# Patient Record
Sex: MALE | Race: WHITE | NOT HISPANIC OR LATINO | Employment: STUDENT | URBAN - METROPOLITAN AREA
[De-identification: names, ages, dates, MRNs, and addresses within clinical notes are randomized per-mention and may not be internally consistent; named-entity substitution may affect disease eponyms.]

---

## 2024-11-24 ENCOUNTER — HOSPITAL ENCOUNTER (EMERGENCY)
Facility: HOSPITAL | Age: 19
Discharge: PSYCHIATRIC OR SUBSTANCE USE FACILITY | End: 2024-11-25
Attending: EMERGENCY MEDICINE
Payer: COMMERCIAL

## 2024-11-24 DIAGNOSIS — R45.89 SUICIDAL BEHAVIOR WITHOUT ATTEMPTED SELF-INJURY: Primary | ICD-10-CM

## 2024-11-24 PROCEDURE — 99285 EMERGENCY DEPT VISIT HI MDM: CPT

## 2024-11-24 ASSESSMENT — COLUMBIA-SUICIDE SEVERITY RATING SCALE - C-SSRS
5. HAVE YOU STARTED TO WORK OUT OR WORKED OUT THE DETAILS OF HOW TO KILL YOURSELF? DO YOU INTEND TO CARRY OUT THIS PLAN?: YES
2. HAVE YOU ACTUALLY HAD ANY THOUGHTS OF KILLING YOURSELF?: YES
6. HAVE YOU EVER DONE ANYTHING, STARTED TO DO ANYTHING, OR PREPARED TO DO ANYTHING TO END YOUR LIFE?: NO
4. HAVE YOU HAD THESE THOUGHTS AND HAD SOME INTENTION OF ACTING ON THEM?: YES
1. IN THE PAST MONTH, HAVE YOU WISHED YOU WERE DEAD OR WISHED YOU COULD GO TO SLEEP AND NOT WAKE UP?: YES

## 2024-11-25 ENCOUNTER — APPOINTMENT (OUTPATIENT)
Dept: CARDIOLOGY | Facility: HOSPITAL | Age: 19
End: 2024-11-25
Payer: COMMERCIAL

## 2024-11-25 ENCOUNTER — HOSPITAL ENCOUNTER (INPATIENT)
Facility: HOSPITAL | Age: 19
End: 2024-11-25
Attending: PSYCHIATRY & NEUROLOGY | Admitting: PSYCHIATRY & NEUROLOGY
Payer: COMMERCIAL

## 2024-11-25 VITALS
HEIGHT: 70 IN | TEMPERATURE: 98.2 F | BODY MASS INDEX: 16.75 KG/M2 | OXYGEN SATURATION: 98 % | HEART RATE: 43 BPM | SYSTOLIC BLOOD PRESSURE: 125 MMHG | DIASTOLIC BLOOD PRESSURE: 70 MMHG | WEIGHT: 117 LBS | RESPIRATION RATE: 14 BRPM

## 2024-11-25 DIAGNOSIS — E55.9 VITAMIN D INSUFFICIENCY: Primary | ICD-10-CM

## 2024-11-25 DIAGNOSIS — F33.2 SEVERE EPISODE OF RECURRENT MAJOR DEPRESSIVE DISORDER, WITHOUT PSYCHOTIC FEATURES (MULTI): ICD-10-CM

## 2024-11-25 DIAGNOSIS — G47.9 SLEEP DIFFICULTIES: ICD-10-CM

## 2024-11-25 PROBLEM — R45.851 DEPRESSION WITH SUICIDAL IDEATION: Status: ACTIVE | Noted: 2024-11-25

## 2024-11-25 PROBLEM — D45 POLYCYTHEMIA VERA: Status: ACTIVE | Noted: 2024-06-03

## 2024-11-25 PROBLEM — R63.6 UNDERWEIGHT: Status: ACTIVE | Noted: 2024-11-25

## 2024-11-25 PROBLEM — R45.89 DEPRESSED MOOD: Status: ACTIVE | Noted: 2024-06-03

## 2024-11-25 PROBLEM — F32.A DEPRESSION WITH SUICIDAL IDEATION: Status: ACTIVE | Noted: 2024-11-25

## 2024-11-25 LAB
ALBUMIN SERPL BCP-MCNC: 4.9 G/DL (ref 3.4–5)
ALP SERPL-CCNC: 69 U/L (ref 33–120)
ALT SERPL W P-5'-P-CCNC: 6 U/L (ref 10–52)
AMPHETAMINES UR QL SCN: NORMAL
ANION GAP SERPL CALC-SCNC: 11 MMOL/L (ref 10–20)
APAP SERPL-MCNC: <10 UG/ML
APPEARANCE UR: CLEAR
AST SERPL W P-5'-P-CCNC: 13 U/L (ref 9–39)
BARBITURATES UR QL SCN: NORMAL
BASOPHILS # BLD AUTO: 0.04 X10*3/UL (ref 0–0.1)
BASOPHILS NFR BLD AUTO: 0.6 %
BENZODIAZ UR QL SCN: NORMAL
BILIRUB SERPL-MCNC: 0.5 MG/DL (ref 0–1.2)
BILIRUB UR STRIP.AUTO-MCNC: NEGATIVE MG/DL
BUN SERPL-MCNC: 13 MG/DL (ref 6–23)
BZE UR QL SCN: NORMAL
CALCIUM SERPL-MCNC: 9.2 MG/DL (ref 8.6–10.3)
CANNABINOIDS UR QL SCN: NORMAL
CHLORIDE SERPL-SCNC: 105 MMOL/L (ref 98–107)
CO2 SERPL-SCNC: 26 MMOL/L (ref 21–32)
COLOR UR: YELLOW
CREAT SERPL-MCNC: 0.93 MG/DL (ref 0.5–1.3)
EGFRCR SERPLBLD CKD-EPI 2021: >90 ML/MIN/1.73M*2
EOSINOPHIL # BLD AUTO: 0.24 X10*3/UL (ref 0–0.7)
EOSINOPHIL NFR BLD AUTO: 3.5 %
ERYTHROCYTE [DISTWIDTH] IN BLOOD BY AUTOMATED COUNT: 12.4 % (ref 11.5–14.5)
ETHANOL SERPL-MCNC: <10 MG/DL
FENTANYL+NORFENTANYL UR QL SCN: NORMAL
GLUCOSE SERPL-MCNC: 113 MG/DL (ref 74–99)
GLUCOSE UR STRIP.AUTO-MCNC: NORMAL MG/DL
HCT VFR BLD AUTO: 50.3 % (ref 41–52)
HGB BLD-MCNC: 17.1 G/DL (ref 13.5–17.5)
HOLD SPECIMEN: NORMAL
IMM GRANULOCYTES # BLD AUTO: 0.01 X10*3/UL (ref 0–0.7)
IMM GRANULOCYTES NFR BLD AUTO: 0.1 % (ref 0–0.9)
KETONES UR STRIP.AUTO-MCNC: NEGATIVE MG/DL
LEUKOCYTE ESTERASE UR QL STRIP.AUTO: NEGATIVE
LYMPHOCYTES # BLD AUTO: 2.19 X10*3/UL (ref 1.2–4.8)
LYMPHOCYTES NFR BLD AUTO: 31.8 %
MCH RBC QN AUTO: 28.1 PG (ref 26–34)
MCHC RBC AUTO-ENTMCNC: 34 G/DL (ref 32–36)
MCV RBC AUTO: 83 FL (ref 80–100)
METHADONE UR QL SCN: NORMAL
MONOCYTES # BLD AUTO: 0.5 X10*3/UL (ref 0.1–1)
MONOCYTES NFR BLD AUTO: 7.3 %
NEUTROPHILS # BLD AUTO: 3.91 X10*3/UL (ref 1.2–7.7)
NEUTROPHILS NFR BLD AUTO: 56.7 %
NITRITE UR QL STRIP.AUTO: NEGATIVE
NRBC BLD-RTO: 0 /100 WBCS (ref 0–0)
OPIATES UR QL SCN: NORMAL
OXYCODONE+OXYMORPHONE UR QL SCN: NORMAL
PCP UR QL SCN: NORMAL
PH UR STRIP.AUTO: 7 [PH]
PLATELET # BLD AUTO: 239 X10*3/UL (ref 150–450)
POTASSIUM SERPL-SCNC: 3.8 MMOL/L (ref 3.5–5.3)
PROT SERPL-MCNC: 7.3 G/DL (ref 6.4–8.2)
PROT UR STRIP.AUTO-MCNC: NEGATIVE MG/DL
RBC # BLD AUTO: 6.09 X10*6/UL (ref 4.5–5.9)
RBC # UR STRIP.AUTO: NEGATIVE /UL
SALICYLATES SERPL-MCNC: <3 MG/DL
SODIUM SERPL-SCNC: 138 MMOL/L (ref 136–145)
SP GR UR STRIP.AUTO: 1.03
UROBILINOGEN UR STRIP.AUTO-MCNC: ABNORMAL MG/DL
WBC # BLD AUTO: 6.9 X10*3/UL (ref 4.4–11.3)

## 2024-11-25 PROCEDURE — 80053 COMPREHEN METABOLIC PANEL: CPT | Performed by: EMERGENCY MEDICINE

## 2024-11-25 PROCEDURE — 81003 URINALYSIS AUTO W/O SCOPE: CPT | Mod: 59 | Performed by: EMERGENCY MEDICINE

## 2024-11-25 PROCEDURE — 80179 DRUG ASSAY SALICYLATE: CPT | Performed by: EMERGENCY MEDICINE

## 2024-11-25 PROCEDURE — 85025 COMPLETE CBC W/AUTO DIFF WBC: CPT | Performed by: EMERGENCY MEDICINE

## 2024-11-25 PROCEDURE — 36415 COLL VENOUS BLD VENIPUNCTURE: CPT | Performed by: EMERGENCY MEDICINE

## 2024-11-25 PROCEDURE — 80307 DRUG TEST PRSMV CHEM ANLYZR: CPT | Performed by: EMERGENCY MEDICINE

## 2024-11-25 PROCEDURE — 99221 1ST HOSP IP/OBS SF/LOW 40: CPT | Performed by: INTERNAL MEDICINE

## 2024-11-25 PROCEDURE — 1240000001 HC SEMI-PRIVATE BH ROOM DAILY

## 2024-11-25 PROCEDURE — 93005 ELECTROCARDIOGRAM TRACING: CPT

## 2024-11-25 PROCEDURE — 99223 1ST HOSP IP/OBS HIGH 75: CPT | Performed by: PSYCHIATRY & NEUROLOGY

## 2024-11-25 PROCEDURE — 2500000001 HC RX 250 WO HCPCS SELF ADMINISTERED DRUGS (ALT 637 FOR MEDICARE OP): Performed by: PSYCHIATRY & NEUROLOGY

## 2024-11-25 RX ORDER — HALOPERIDOL 5 MG/1
5 TABLET ORAL EVERY 6 HOURS PRN
Status: DISCONTINUED | OUTPATIENT
Start: 2024-11-25 | End: 2024-12-03 | Stop reason: HOSPADM

## 2024-11-25 RX ORDER — HYDROXYZINE PAMOATE 50 MG/1
50 CAPSULE ORAL EVERY 4 HOURS PRN
Status: DISCONTINUED | OUTPATIENT
Start: 2024-11-25 | End: 2024-12-03 | Stop reason: HOSPADM

## 2024-11-25 RX ORDER — ALUMINUM HYDROXIDE, MAGNESIUM HYDROXIDE, AND SIMETHICONE 1200; 120; 1200 MG/30ML; MG/30ML; MG/30ML
30 SUSPENSION ORAL EVERY 6 HOURS PRN
Status: DISCONTINUED | OUTPATIENT
Start: 2024-11-25 | End: 2024-12-03 | Stop reason: HOSPADM

## 2024-11-25 RX ORDER — ACETAMINOPHEN 500 MG
5 TABLET ORAL NIGHTLY PRN
Status: DISCONTINUED | OUTPATIENT
Start: 2024-11-25 | End: 2024-12-03 | Stop reason: HOSPADM

## 2024-11-25 RX ORDER — IBUPROFEN 600 MG/1
600 TABLET ORAL EVERY 8 HOURS PRN
Status: DISCONTINUED | OUTPATIENT
Start: 2024-11-25 | End: 2024-12-03 | Stop reason: HOSPADM

## 2024-11-25 RX ORDER — ESCITALOPRAM OXALATE 5 MG/1
5 TABLET ORAL DAILY
COMMUNITY
End: 2024-12-03 | Stop reason: HOSPADM

## 2024-11-25 RX ORDER — DIPHENHYDRAMINE HYDROCHLORIDE 50 MG/ML
50 INJECTION INTRAMUSCULAR; INTRAVENOUS ONCE AS NEEDED
Status: DISCONTINUED | OUTPATIENT
Start: 2024-11-25 | End: 2024-12-03 | Stop reason: HOSPADM

## 2024-11-25 RX ORDER — LORAZEPAM 1 MG/1
2 TABLET ORAL EVERY 6 HOURS PRN
Status: DISCONTINUED | OUTPATIENT
Start: 2024-11-25 | End: 2024-12-03 | Stop reason: HOSPADM

## 2024-11-25 RX ORDER — ESCITALOPRAM OXALATE 10 MG/1
10 TABLET ORAL NIGHTLY
Status: DISCONTINUED | OUTPATIENT
Start: 2024-11-25 | End: 2024-11-26

## 2024-11-25 RX ORDER — MICONAZOLE NITRATE 2 %
2 CREAM (GRAM) TOPICAL EVERY 2 HOUR PRN
Status: DISCONTINUED | OUTPATIENT
Start: 2024-11-25 | End: 2024-11-25

## 2024-11-25 RX ORDER — DIPHENHYDRAMINE HCL 50 MG
50 CAPSULE ORAL EVERY 6 HOURS PRN
Status: DISCONTINUED | OUTPATIENT
Start: 2024-11-25 | End: 2024-12-03 | Stop reason: HOSPADM

## 2024-11-25 RX ORDER — HALOPERIDOL 5 MG/ML
5 INJECTION INTRAMUSCULAR EVERY 6 HOURS PRN
Status: DISCONTINUED | OUTPATIENT
Start: 2024-11-25 | End: 2024-12-03 | Stop reason: HOSPADM

## 2024-11-25 RX ORDER — LORAZEPAM 2 MG/ML
2 INJECTION INTRAMUSCULAR EVERY 6 HOURS PRN
Status: DISCONTINUED | OUTPATIENT
Start: 2024-11-25 | End: 2024-12-03 | Stop reason: HOSPADM

## 2024-11-25 RX ADMIN — ESCITALOPRAM OXALATE 10 MG: 10 TABLET ORAL at 20:05

## 2024-11-25 SDOH — HEALTH STABILITY: MENTAL HEALTH: HOW OFTEN DO YOU HAVE SIX OR MORE DRINKS ON ONE OCCASION?: NEVER

## 2024-11-25 SDOH — ECONOMIC STABILITY: TRANSPORTATION INSECURITY: IN THE PAST 12 MONTHS, HAS LACK OF TRANSPORTATION KEPT YOU FROM MEDICAL APPOINTMENTS OR FROM GETTING MEDICATIONS?: NO

## 2024-11-25 SDOH — SOCIAL STABILITY: SOCIAL INSECURITY: ARE YOU MARRIED, WIDOWED, DIVORCED, SEPARATED, NEVER MARRIED, OR LIVING WITH A PARTNER?: NEVER MARRIED

## 2024-11-25 SDOH — SOCIAL STABILITY: SOCIAL NETWORK: IN A TYPICAL WEEK, HOW MANY TIMES DO YOU TALK ON THE PHONE WITH FAMILY, FRIENDS, OR NEIGHBORS?: TWICE A WEEK

## 2024-11-25 SDOH — HEALTH STABILITY: MENTAL HEALTH

## 2024-11-25 SDOH — SOCIAL STABILITY: SOCIAL INSECURITY
WITHIN THE LAST YEAR, HAVE YOU BEEN KICKED, HIT, SLAPPED, OR OTHERWISE PHYSICALLY HURT BY YOUR PARTNER OR EX-PARTNER?: NO

## 2024-11-25 SDOH — HEALTH STABILITY: MENTAL HEALTH: BEHAVIORS/MOOD: CALM;PLEASANT;COOPERATIVE;WITHDRAWN

## 2024-11-25 SDOH — HEALTH STABILITY: MENTAL HEALTH: ANXIETY SYMPTOMS: NO PROBLEMS REPORTED OR OBSERVED.

## 2024-11-25 SDOH — HEALTH STABILITY: MENTAL HEALTH: BEHAVIORS/MOOD: PLEASANT;CALM;WITHDRAWN;COOPERATIVE

## 2024-11-25 SDOH — SOCIAL STABILITY: SOCIAL NETWORK: PARENT/GUARDIAN/SIGNIFICANT OTHER INVOLVEMENT: ATTENTIVE TO PATIENT NEEDS

## 2024-11-25 SDOH — SOCIAL STABILITY: SOCIAL NETWORK: HOW OFTEN DO YOU ATTEND CHURCH OR RELIGIOUS SERVICES?: NEVER

## 2024-11-25 SDOH — ECONOMIC STABILITY: FOOD INSECURITY: HOW HARD IS IT FOR YOU TO PAY FOR THE VERY BASICS LIKE FOOD, HOUSING, MEDICAL CARE, AND HEATING?: NOT HARD AT ALL

## 2024-11-25 SDOH — SOCIAL STABILITY: SOCIAL NETWORK

## 2024-11-25 SDOH — HEALTH STABILITY: MENTAL HEALTH: HAVE YOU EVER DONE ANYTHING, STARTED TO DO ANYTHING, OR PREPARED TO DO ANYTHING TO END YOUR LIFE?: NO

## 2024-11-25 SDOH — SOCIAL STABILITY: SOCIAL INSECURITY: FAMILY BEHAVIORS: APPROPRIATE FOR SITUATION;CALM;SUPPORTIVE;COOPERATIVE

## 2024-11-25 SDOH — HEALTH STABILITY: MENTAL HEALTH: FOR HIGH RISK PATIENTS: 1:1 PATIENT OBSERVER AT ALL TIMES

## 2024-11-25 SDOH — HEALTH STABILITY: PHYSICAL HEALTH: ON AVERAGE, HOW MANY DAYS PER WEEK DO YOU ENGAGE IN MODERATE TO STRENUOUS EXERCISE (LIKE A BRISK WALK)?: 0 DAYS

## 2024-11-25 SDOH — SOCIAL STABILITY: SOCIAL INSECURITY
WITHIN THE LAST YEAR, HAVE YOU BEEN RAPED OR FORCED TO HAVE ANY KIND OF SEXUAL ACTIVITY BY YOUR PARTNER OR EX-PARTNER?: NO

## 2024-11-25 SDOH — SOCIAL STABILITY: SOCIAL INSECURITY: DO YOU FEEL ANYONE HAS EXPLOITED OR TAKEN ADVANTAGE OF YOU FINANCIALLY OR OF YOUR PERSONAL PROPERTY?: NO

## 2024-11-25 SDOH — SOCIAL STABILITY: SOCIAL INSECURITY: ARE THERE ANY APPARENT SIGNS OF INJURIES/BEHAVIORS THAT COULD BE RELATED TO ABUSE/NEGLECT?: NO

## 2024-11-25 SDOH — ECONOMIC STABILITY: HOUSING INSECURITY: IN THE LAST 12 MONTHS, WAS THERE A TIME WHEN YOU WERE NOT ABLE TO PAY THE MORTGAGE OR RENT ON TIME?: NO

## 2024-11-25 SDOH — ECONOMIC STABILITY: HOUSING INSECURITY: AT ANY TIME IN THE PAST 12 MONTHS, WERE YOU HOMELESS OR LIVING IN A SHELTER (INCLUDING NOW)?: NO

## 2024-11-25 SDOH — HEALTH STABILITY: MENTAL HEALTH: HAVE YOU BEEN THINKING ABOUT HOW YOU MIGHT DO THIS?: YES

## 2024-11-25 SDOH — HEALTH STABILITY: PHYSICAL HEALTH
HOW OFTEN DO YOU NEED TO HAVE SOMEONE HELP YOU WHEN YOU READ INSTRUCTIONS, PAMPHLETS, OR OTHER WRITTEN MATERIAL FROM YOUR DOCTOR OR PHARMACY?: NEVER

## 2024-11-25 SDOH — HEALTH STABILITY: MENTAL HEALTH: HOW OFTEN DO YOU HAVE A DRINK CONTAINING ALCOHOL?: NEVER

## 2024-11-25 SDOH — HEALTH STABILITY: MENTAL HEALTH: BEHAVIORAL HEALTH(WDL): EXCEPTIONS TO WDL

## 2024-11-25 SDOH — HEALTH STABILITY: MENTAL HEALTH: BEHAVIORS/MOOD: CALM;WITHDRAWN

## 2024-11-25 SDOH — HEALTH STABILITY: MENTAL HEALTH
DEPRESSION SYMPTOMS: FEELINGS OF HELPLESSNESS;FEELINGS OF HOPELESSESS;FEELINGS OF WORTHLESSNESS;ISOLATIVE;LOSS OF INTEREST

## 2024-11-25 SDOH — HEALTH STABILITY: MENTAL HEALTH
DO YOU FEEL STRESS - TENSE, RESTLESS, NERVOUS, OR ANXIOUS, OR UNABLE TO SLEEP AT NIGHT BECAUSE YOUR MIND IS TROUBLED ALL THE TIME - THESE DAYS?: RATHER MUCH

## 2024-11-25 SDOH — ECONOMIC STABILITY: FOOD INSECURITY: WITHIN THE PAST 12 MONTHS, THE FOOD YOU BOUGHT JUST DIDN'T LAST AND YOU DIDN'T HAVE MONEY TO GET MORE.: NEVER TRUE

## 2024-11-25 SDOH — SOCIAL STABILITY: SOCIAL INSECURITY: FAMILY BEHAVIORS: COOPERATIVE;SUPPORTIVE;CALM

## 2024-11-25 SDOH — SOCIAL STABILITY: SOCIAL INSECURITY: WITHIN THE LAST YEAR, HAVE YOU BEEN HUMILIATED OR EMOTIONALLY ABUSED IN OTHER WAYS BY YOUR PARTNER OR EX-PARTNER?: NO

## 2024-11-25 SDOH — HEALTH STABILITY: MENTAL HEALTH: BEHAVIORS/MOOD: SLEEPING;CALM

## 2024-11-25 SDOH — HEALTH STABILITY: MENTAL HEALTH: BEHAVIORS/MOOD: CALM;WITHDRAWN;PLEASANT;COOPERATIVE

## 2024-11-25 SDOH — ECONOMIC STABILITY: HOUSING INSECURITY: FEELS SAFE LIVING IN HOME: YES

## 2024-11-25 SDOH — SOCIAL STABILITY: SOCIAL INSECURITY: FAMILY BEHAVIORS: APPROPRIATE FOR SITUATION;CALM;COOPERATIVE;SUPPORTIVE

## 2024-11-25 SDOH — HEALTH STABILITY: MENTAL HEALTH: BEHAVIORS/MOOD: CALM;SLEEPING

## 2024-11-25 SDOH — SOCIAL STABILITY: SOCIAL NETWORK: HOW OFTEN DO YOU GET TOGETHER WITH FRIENDS OR RELATIVES?: ONCE A WEEK

## 2024-11-25 SDOH — HEALTH STABILITY: MENTAL HEALTH: BEHAVIORS/MOOD: CALM;PLEASANT;COOPERATIVE

## 2024-11-25 SDOH — SOCIAL STABILITY: SOCIAL INSECURITY: FAMILY BEHAVIORS: CALM;SUPPORTIVE;COOPERATIVE

## 2024-11-25 SDOH — SOCIAL STABILITY: SOCIAL INSECURITY: HAVE YOU HAD THOUGHTS OF HARMING ANYONE ELSE?: NO

## 2024-11-25 SDOH — ECONOMIC STABILITY: INCOME INSECURITY: IN THE PAST 12 MONTHS HAS THE ELECTRIC, GAS, OIL, OR WATER COMPANY THREATENED TO SHUT OFF SERVICES IN YOUR HOME?: NO

## 2024-11-25 SDOH — HEALTH STABILITY: MENTAL HEALTH: ACTIVE SUICIDAL IDEATION WITH SPECIFIC PLAN AND INTENT (PAST 1 MONTH): YES

## 2024-11-25 SDOH — SOCIAL STABILITY: SOCIAL INSECURITY: WERE YOU ABLE TO COMPLETE ALL THE BEHAVIORAL HEALTH SCREENINGS?: YES

## 2024-11-25 SDOH — SOCIAL STABILITY: SOCIAL INSECURITY: FAMILY BEHAVIORS: NON-COMPLIANT;SUPPORTIVE;COOPERATIVE

## 2024-11-25 SDOH — HEALTH STABILITY: MENTAL HEALTH: HAVE YOU ACTUALLY HAD ANY THOUGHTS OF KILLING YOURSELF?: YES

## 2024-11-25 SDOH — SOCIAL STABILITY: SOCIAL INSECURITY: HAVE YOU HAD ANY THOUGHTS OF HARMING ANYONE ELSE?: NO

## 2024-11-25 SDOH — SOCIAL STABILITY: SOCIAL INSECURITY

## 2024-11-25 SDOH — HEALTH STABILITY: MENTAL HEALTH: HOW MANY DRINKS CONTAINING ALCOHOL DO YOU HAVE ON A TYPICAL DAY WHEN YOU ARE DRINKING?: PATIENT DOES NOT DRINK

## 2024-11-25 SDOH — HEALTH STABILITY: MENTAL HEALTH: WISH TO BE DEAD (PAST 1 MONTH): YES

## 2024-11-25 SDOH — SOCIAL STABILITY: SOCIAL NETWORK
DO YOU BELONG TO ANY CLUBS OR ORGANIZATIONS SUCH AS CHURCH GROUPS, UNIONS, FRATERNAL OR ATHLETIC GROUPS, OR SCHOOL GROUPS?: YES

## 2024-11-25 SDOH — ECONOMIC STABILITY: FOOD INSECURITY: WITHIN THE PAST 12 MONTHS, YOU WORRIED THAT YOUR FOOD WOULD RUN OUT BEFORE YOU GOT THE MONEY TO BUY MORE.: NEVER TRUE

## 2024-11-25 SDOH — SOCIAL STABILITY: SOCIAL INSECURITY: HAS ANYONE EVER THREATENED TO HURT YOUR FAMILY OR YOUR PETS?: NO

## 2024-11-25 SDOH — HEALTH STABILITY: MENTAL HEALTH: HAVE YOU WISHED YOU WERE DEAD OR WISHED YOU COULD GO TO SLEEP AND NOT WAKE UP?: YES

## 2024-11-25 SDOH — SOCIAL STABILITY: SOCIAL INSECURITY: DO YOU FEEL UNSAFE GOING BACK TO THE PLACE WHERE YOU ARE LIVING?: NO

## 2024-11-25 SDOH — SOCIAL STABILITY: SOCIAL INSECURITY: WITHIN THE LAST YEAR, HAVE YOU BEEN AFRAID OF YOUR PARTNER OR EX-PARTNER?: NO

## 2024-11-25 SDOH — SOCIAL STABILITY: SOCIAL NETWORK: HOW OFTEN DO YOU ATTEND MEETINGS OF THE CLUBS OR ORGANIZATIONS YOU BELONG TO?: 1 TO 4 TIMES PER YEAR

## 2024-11-25 SDOH — HEALTH STABILITY: MENTAL HEALTH: SUICIDE ASSESSMENT: ADULT (C-SSRS)

## 2024-11-25 SDOH — SOCIAL STABILITY: SOCIAL INSECURITY: DOES ANYONE TRY TO KEEP YOU FROM HAVING/CONTACTING OTHER FRIENDS OR DOING THINGS OUTSIDE YOUR HOME?: NO

## 2024-11-25 SDOH — HEALTH STABILITY: MENTAL HEALTH: SUICIDAL BEHAVIOR (LIFETIME): YES

## 2024-11-25 SDOH — SOCIAL STABILITY: SOCIAL INSECURITY: ABUSE: ADULT

## 2024-11-25 SDOH — HEALTH STABILITY: MENTAL HEALTH: ACTIVE SUICIDAL IDEATION WITH SOME INTENT TO ACT, WITHOUT SPECIFIC PLAN (PAST 1 MONTH): YES

## 2024-11-25 SDOH — ECONOMIC STABILITY: HOUSING INSECURITY: IN THE PAST 12 MONTHS, HOW MANY TIMES HAVE YOU MOVED WHERE YOU WERE LIVING?: 2

## 2024-11-25 SDOH — HEALTH STABILITY: MENTAL HEALTH
HAVE YOU STARTED TO WORK OUT OR WORKED OUT THE DETAILS OF HOW TO KILL YOURSELF? DO YOU INTENT TO CARRY OUT THIS PLAN?: YES

## 2024-11-25 SDOH — HEALTH STABILITY: MENTAL HEALTH: HAVE YOU HAD THESE THOUGHTS AND HAD SOME INTENTION OF ACTING ON THEM?: YES

## 2024-11-25 SDOH — HEALTH STABILITY: MENTAL HEALTH: SUICIDAL BEHAVIOR (3 MONTHS): YES

## 2024-11-25 SDOH — SOCIAL STABILITY: SOCIAL INSECURITY: ARE YOU OR HAVE YOU BEEN THREATENED OR ABUSED PHYSICALLY, EMOTIONALLY, OR SEXUALLY BY ANYONE?: NO

## 2024-11-25 SDOH — HEALTH STABILITY: MENTAL HEALTH: NON-SPECIFIC ACTIVE SUICIDAL THOUGHTS (PAST 1 MONTH): YES

## 2024-11-25 SDOH — HEALTH STABILITY: PHYSICAL HEALTH: ON AVERAGE, HOW MANY MINUTES DO YOU ENGAGE IN EXERCISE AT THIS LEVEL?: 0 MIN

## 2024-11-25 ASSESSMENT — LIFESTYLE VARIABLES
HOW OFTEN DO YOU HAVE A DRINK CONTAINING ALCOHOL: NEVER
AUDITORY DISTURBANCES: NOT PRESENT
AGITATION: NORMAL ACTIVITY
ORIENTATION AND CLOUDING OF SENSORIUM: ORIENTED AND CAN DO SERIAL ADDITIONS
SKIP TO QUESTIONS 9-10: 1
PULSE: 76
HEADACHE, FULLNESS IN HEAD: NOT PRESENT
TOTAL_SCORE: 0
EVER HAD A DRINK FIRST THING IN THE MORNING TO STEADY YOUR NERVES TO GET RID OF A HANGOVER: NO
AUDIT-C TOTAL SCORE: 0
AUDIT-C TOTAL SCORE: 0
HOW MANY STANDARD DRINKS CONTAINING ALCOHOL DO YOU HAVE ON A TYPICAL DAY: PATIENT DOES NOT DRINK
BLOOD PRESSURE: 116/78
AUDIT-C TOTAL SCORE: 0
SUBSTANCE_ABUSE_PAST_12_MONTHS: NO
TREMOR: NO TREMOR
HOW OFTEN DO YOU HAVE 6 OR MORE DRINKS ON ONE OCCASION: NEVER
NAUSEA AND VOMITING: NO NAUSEA AND NO VOMITING
HAVE PEOPLE ANNOYED YOU BY CRITICIZING YOUR DRINKING: NO
SUBSTANCE_ABUSE_PAST_12_MONTHS: NO
PRESCIPTION_ABUSE_PAST_12_MONTHS: NO
TOTAL SCORE: 0
EVER FELT BAD OR GUILTY ABOUT YOUR DRINKING: NO
ANXIETY: NO ANXIETY, AT EASE
CIWA TOTAL SCORE: 0
HAVE YOU EVER FELT YOU SHOULD CUT DOWN ON YOUR DRINKING: NO
PAROXYSMAL SWEATS: NO SWEAT VISIBLE
VISUAL DISTURBANCES: NOT PRESENT
SKIP TO QUESTIONS 9-10: 1
PRESCIPTION_ABUSE_PAST_12_MONTHS: NO

## 2024-11-25 ASSESSMENT — ACTIVITIES OF DAILY LIVING (ADL)
DRESSING YOURSELF: INDEPENDENT
GROOMING: INDEPENDENT
LACK_OF_TRANSPORTATION: NO
ADEQUATE_TO_COMPLETE_ADL: YES
HEARING - LEFT EAR: FUNCTIONAL
WALKS IN HOME: INDEPENDENT
JUDGMENT_ADEQUATE_SAFELY_COMPLETE_DAILY_ACTIVITIES: YES
FEEDING YOURSELF: INDEPENDENT
HEARING - RIGHT EAR: FUNCTIONAL
TOILETING: INDEPENDENT
BATHING: INDEPENDENT
PATIENT'S MEMORY ADEQUATE TO SAFELY COMPLETE DAILY ACTIVITIES?: YES

## 2024-11-25 ASSESSMENT — COLUMBIA-SUICIDE SEVERITY RATING SCALE - C-SSRS
1. SINCE LAST CONTACT, HAVE YOU WISHED YOU WERE DEAD OR WISHED YOU COULD GO TO SLEEP AND NOT WAKE UP?: NO
6. HAVE YOU EVER DONE ANYTHING, STARTED TO DO ANYTHING, OR PREPARED TO DO ANYTHING TO END YOUR LIFE?: NO
2. HAVE YOU ACTUALLY HAD ANY THOUGHTS OF KILLING YOURSELF?: NO

## 2024-11-25 ASSESSMENT — PATIENT HEALTH QUESTIONNAIRE - PHQ9
1. LITTLE INTEREST OR PLEASURE IN DOING THINGS: NEARLY EVERY DAY
SUM OF ALL RESPONSES TO PHQ9 QUESTIONS 1 & 2: 6
2. FEELING DOWN, DEPRESSED OR HOPELESS: NEARLY EVERY DAY

## 2024-11-25 ASSESSMENT — ENCOUNTER SYMPTOMS
DYSPHORIC MOOD: 1
DECREASED CONCENTRATION: 1
HALLUCINATIONS: 0
EYES NEGATIVE: 1
CONSTITUTIONAL NEGATIVE: 1
GASTROINTESTINAL NEGATIVE: 1
RESPIRATORY NEGATIVE: 1
NERVOUS/ANXIOUS: 1
ENDOCRINE NEGATIVE: 1
CONFUSION: 0
CARDIOVASCULAR NEGATIVE: 1
NEUROLOGICAL NEGATIVE: 1
AGITATION: 0
SLEEP DISTURBANCE: 1
HYPERACTIVE: 0
ALLERGIC/IMMUNOLOGIC NEGATIVE: 1
MUSCULOSKELETAL NEGATIVE: 1
HEMATOLOGIC/LYMPHATIC NEGATIVE: 1

## 2024-11-25 ASSESSMENT — PAIN - FUNCTIONAL ASSESSMENT
PAIN_FUNCTIONAL_ASSESSMENT: 0-10

## 2024-11-25 ASSESSMENT — PAIN SCALES - GENERAL
PAINLEVEL_OUTOF10: 0 - NO PAIN

## 2024-11-25 ASSESSMENT — PAIN DESCRIPTION - PROGRESSION: CLINICAL_PROGRESSION: NOT CHANGED

## 2024-11-25 NOTE — PROGRESS NOTES
EPAT - Social Work Psychiatric Assessment    Arrival Details  Mode of Arrival: Ambulance  Admission Source:  (Community)  Admission Type: Involuntary  EPAT Assessment Start Date: 11/25/24  EPAT Assessment Start Time: 0715  Name of : SARABJIT Onofre LSW    History of Present Illness  Admission Reason: Suicidal Ideation  HPI:     Patient is a 18yo male presenting to the ED via PD on pink slip with chief complaint of suicidal ideation. Reportedly, “he feels like all his friends and family hate him and are disappointed in him. He advised he was planning on waiting by the train tracks behind over easy for a train to come by to end his life. He stated he's been wanting to hurt himself but can't get the courage to”. Patient's chart, triage, and provider note reviewed prior to assessment. Patient has a psychiatric history of Depression. He is not engaged in outpatient services but is prescribed Lexapro through his PCP. The patient denied history of admissions. He denied hx of SIB/SA, indicated high risk at triage. BAL and UTOX negative on arrival.     SW Readmission Information   Readmission within 30 Days: No    Psychiatric Symptoms  Anxiety Symptoms: No problems reported or observed.  Depression Symptoms: Feelings of helplessness, Feelings of hopelessess, Feelings of worthlessness, Isolative, Loss of interest  Bessie Symptoms: No problems reported or observed.    Psychosis Symptoms  Hallucination Type: No problems reported or observed.  Delusion Type: No problems reported or observed.    Additional Symptoms - Adult  Generalized Anxiety Disorder: No problems reported or observed.  Obsessive Compulsive Disorder: No problems reported or observed.  Panic Attack: No problems reported or observed.  Post Traumatic Stress Disorder: No problems reported or observed.  Delirium: No problems reported or observed.    Past Psychiatric History/Meds/Treatments  Past Psychiatric History: Psychiatric Diagnosis: Depression //  Current MH Center: none // Previous Admissions: Denies  Past Psychiatric Meds/Treatments: Lexapro 5mg  Past Violence/Victimization History: None reported    Current Mental Health Contacts   Name/Phone Number: none   Last Appointment Date: none  Provider Name/Phone Number: Dr. Engel (PCP)  Provider Last Appointment Date: 6/3/24    Support System: Immediate family, Friends    Living Arrangement:  (lives on campus in dorm)    Home Safety  Feels Safe Living in Home: Yes    Income Information  Employment Status for: Patient  Employment Status: Unemployed  Income Source: Family    [employer] Service/Education History  Current or Previous  Service: None  Education Level:  (currently sophomore in college)  History of Learning Problems: No  History of School Behavior Problems: No    Social/Cultural History  Social History: [place] citizen: yes // Payee: none // Guardian/POA: Self  Cultural Requests During Hospitalization: none  Spiritual Requests During Hospitalization: none  Important Activities:  (none reported)    Legal  Legal Considerations: Patient/ Family Capacity to Make Sound Judgments  Criminal Activity/ Legal Involvement Pertinent to Current Situation/ Hospitalization: None    Drug Screening  Have you used any substances (canabis, cocaine, heroin, hallucinogens, inhalants, etc.) in the past 12 months?: No  Have you used any prescription drugs other than prescribed in the past 12 months?: No  Is a toxicology screen needed?: Yes    Stage of Change  Stage of Change:  (n/a)    Behavioral Health  Behavioral Health(WDL): Within Defined Limits  Behaviors/Mood: Calm, Cooperative  Affect: Appropriate to circumstances    Orientation  Orientation Level: Oriented X4    General Appearance  Motor Activity: Unremarkable  Speech Pattern: Excessively soft  General Attitude: Attentive, Cooperative, Pleasant  Appearance/Hygiene: Disheveled    Thought Process  Coherency: [place] thinking  Content:  Unremarkable  Delusions:  (None)  Perception: Not altered  Hallucination: None  Judgment/Insight: Impaired  Confusion: None  Cognition: Appropriate attention/concentration, Appropriate safety awareness, Poor judgement    Sleep Pattern  Sleep Pattern: Naps during the day, Difficulty falling asleep    Risk Factors  Self Harm/Suicidal Ideation Plan: SI with plan to lie on train tracks; preperatory behavior taken last night  Previous Self Harm/Suicidal Plans: Denies  Risk Factors: Male, Major mental illness    Violence Risk Assessment  Assessment of Violence: None noted  Thoughts of Harm to Others: No    Ability to Assess Risk Screen  Risk Screen - Ability to Assess: Able to be screened  [place] Suicide Severity Rating Scale (Screener/Recent Self-Report)  1. Wish to be Dead (Past 1 Month): Yes  2. Non-Specific Active Suicidal Thoughts (Past 1 Month): Yes  3. Active Suicidal Ideation with any Methods (Not Plan) Without Intent to Act (Past 1 Month): Yes  4. Active Suicidal Ideation with Some Intent to Act, Without Specific Plan (Past 1 Month): Yes  5. Active Suicidal Ideation with Specific Plan and Intent (Past 1 Month): Yes  6. Suicidal Behavior (Lifetime): Yes  6. Suicidal Behavior (3 Months): Yes  Calculated C-SSRS Risk Score (Lifetime/Recent): High Risk  Step 1: Risk Factors  Current & Past Psychiatric Dx: Mood disorder  Presenting Symptoms: Anhedonia, Impulsivity, Hopelessness or despair  Precipitants/Stressors: Triggering events leading to humiliation, shame, and/or despair (e.g. loss of relationship, financial or health status) (real or anticipated), Inadequate social supports, Perceived burden on others  Change in Treatment: Hopeless or dissatisfied with provider or treatment  Access to Lethal Methods : No (Pt reports his father has a firearm at family home)  Step 2: Protective Factors   Protective Factors Internal: Fear of death or the actual act of killing self  Protective Factors External: Supportive social  network or family or friends  Step 3: Suicidal Ideation Intensity  How Many Times Have You Had These Thoughts: Daily or almost daily  When You Have the Thoughts How Long do They Last : 4-8 hours/most of the day  Could/Can You Stop Thinking About Killing Yourself or Wanting to Die if You Want to: Unable to control thoughts  Are There Things - Anyone or Anything - That Stopped You From Wanting to Die or Acting on: Uncertain that deterrents stopped you  What Sort of Reasons Did You Have For Thinking About Wanting to Die or Killing Yourself: Mostly to end or stop the pain (you couldn't go on living with the pain or how you were feeling)  Total Score: 20  Step 5: Documentation  Risk Level: Moderate suicide risk (Patient is moderate acute risk in the setting of plan for inpatient admission given lack of access to lethal means on unit. Discussed with Dr. Hines)    Psychiatric Impression and Plan of Care  Assessment and Plan:     Upon assessment the patient remained calm, cooperative, and notably dysthymic. Patient presented as withdrawn with limited eye contact and concrete thought process. The patient reported he had been talking with a friend last night and “said something about wanting to end my life”, so his friend called Rogelio NARANJO. He endorsed explicitly stating SI with plan to get hit by the train. Patient reported having taken preparatory actions including walking downtown and “being in the area with the tracks” but he was not forthcoming in terms of intent. The patient otherwise denied HI/AVH and no delusions were elicited. Patient was unable to identify a specific trigger or stressor to current depressive episode but endorses having “struggled for a while now”. As a result of worsening depressive symptoms, patient is now also reportedly failing a number of college courses. The patient states he “gave up on school” and has not been engaging academically or socially. Patient identified his parents and friends as  "primary supports but reported notably poor self-esteem/image & indicated he felt like a burden to others. He reported constant negative self-talk and belief that he is overweight despite his current physique. Ultimately, patient expressed he is “glad to be here” and remained help-seeking throughout.     Diagnostic Impression: Unspecified Depressive Disorder    Psychiatric Impression and Plan for Care: Patient presents as an acute risk to self. Recommendation for admission discussed with Kacey Hines MD who is in agreement.     Specific Resources Provided to Patient: Admission    Outcome/Disposition  Patient's Perception of Outcome Achieved: \"I'm glad to be here\"  Assessment, Recommendations and Risk Level Reviewed with: Dr. Hines  Contact Name: Belen Boyd  Contact Number(s): [phone]  Contact Relationship: Mother  EPAT Assessment Completed Date: 11/25/24  EPAT Assessment Completed Time: 0806      "

## 2024-11-25 NOTE — SIGNIFICANT EVENT
Application for Emergency Admission      Ready for Transfer?  Is the patient medically cleared for transfer to inpatient psychiatry: Yes  Has the patient been accepted to an inpatient psychiatric hospital: Yes    Application for Emergency Admission  IN ACCORDANCE [place] 5122.10 [place]  The Chief Clinical Officer of:  ELLIE Lawson 11/25/2024 .9:19 AM    Reason for Hospitalization  The undersigned has reason to believe that: Taj Boyd Is a mentally ill person subject to hospitalization by court order under division B Section 5122.01 of the Revised Code, i.e., this person:    1.Yes  Represents a substantial risk of physical harm to self as manifested by evidence of threats of, or attempts at, suicide or serious self-inflicted bodily harm    2.No Represents a substantial risk of physical harm to others as manifested by evidence of recent homicidal or other violent behavior, evidence of recent threats that place another in reasonable fear of violent behavior and serious physical harm, or other evidence of present dangerousness    3.No Represents a substantial and immediate risk of serious physical impairment or injury to self as manifested by  evidence that the person is unable to provide for and is not providing for the person's basic physical needs because of the person's mental illness and that appropriate provision for those needs cannot be made  immediately available in the community    4.Yes Would benefit from treatment in a hospital for his mental illness and is in need of such treatment as manifested by evidence of behavior that creates a grave and imminent risk to substantial rights of others or  himself.    5.Yes Would benefit from treatment as manifested by evidence of behavior that indicates all of the following:       (a) The person is unlikely to survive safely in the community without supervision, based on a clinical determination.       (b) The person has a history of lack of compliance with  treatment for mental illness and one of the following applies:      (i) At least twice within the thirty-six months prior to the filing of an affidavit seeking court-ordered treatment of the person under section 5122.111 of the Revised Code, the lack of compliance has been a significant factor in necessitating hospitalization in a hospital or receipt of services in a forensic or other mental health unit of a correctional facility, provided that the thirty-six-month period shall be extended by the length of any hospitalization or incarceration of the person that occurred within the thirty-six-month period.      (ii) Within the forty-eight months prior to the filing of an affidavit seeking court-ordered treatment of the person under section 5122.111 of the Revised Code, the lack of compliance resulted in one or more acts of serious violent behavior toward self or others or threats of, or attempts at, serious physical harm to self or others, provided that the forty-eight-month period shall be extended by the length of any hospitalization or incarceration of the person that occurred within the forty-eight-month period.      (c) The person, as a result of mental illness, is unlikely to voluntarily participate in necessary treatment.       (d) In view of the person's treatment history and current behavior, the person is in need of treatment in order to prevent a relapse or deterioration that would be likely to result in substantial risk of serious harm to the person or others.    (e) Represents a substantial risk of physical harm to self or others if allowed to remain at liberty pending examination.    Therefore, it is requested that said person be admitted to the above named facility.    STATEMENT OF BELIEF    Must be filled out by one of the following: a psychiatrist, licensed physician, licensed clinical psychologist, health or ,  or .  (Statement shall include the circumstances under  which the individual was taken into custody and the reason for the person's belief that hospitalization is necessary. The statement shall also include a reference to efforts made to secure the individual's property at his residence if he was taken into custody there. Every reasonable and appropriate effort should be made to take this person into custody in the least conspicuous manner possible.)    Had reported suicidal ideations with a plan    Patient is pink slipped for admission to [facility] for mental health evaluation    Kacey Hines MD 11/25/2024     _____________________________________________________________   Place of Employment: unknown    STATEMENT OF OBSERVATION BY PSYCHIATRIST, LICENSED PHYSICIAN, OR LICENSED CLINICAL PSYCHOLOGIST, IF APPLICABLE    Place of Observation (e.g., [facility], general hospital, office, emergency facility)  (If applicable, please complete)    Kacey Hines MD 11/25/2024    _____________________________________________________________

## 2024-11-25 NOTE — GROUP NOTE
"Group Topic: Coping Skills   Group Date: 11/25/2024  Start Time: 1600  End Time: 1630  Facilitators: ROSALINDA Saavedra   Department: [facility]    Number of Participants: 1   Group Focus: coping skills, other stress management, and personal responsibility  Treatment Modality: Recreational Therapy   Interventions utilized were Coping with Stress, exploration, patient education, story telling, and support  Purpose: coping skills, insight or knowledge, and self-care    Name: Taj Boyd YOB: 2005   MR: [id number]      Facilitator: Recreational Therapist  Level of Participation: did not attend  Progress: None  Comments: Patients were provided with the \"Coping with Stress\" worksheet which includes a variety of areas (things that make me feel stress, changes in my body, thoughts I have, things I do, and when I feel stress I cope by). We worked together as a group to brainstorm ideas/answers to those questions and patients were given an opportunity to share about personal stressors and situations.    Patient declined invitation to group activity at this time. Patient will continue to be provided with opportunities to enhance leisure skills and/or coping mechanisms.    Plan: continue with services      "

## 2024-11-25 NOTE — ED PROVIDER NOTES
HPI   Chief Complaint   Patient presents with    Suicidal     Pt arrives to the ED r/t SI. Pt states that he feels like his family and friends believe that he is worthless. PT has a plan that he would go to the train tracks and lay down and wait for a train to hit him. Pt denies any HI. Denies any AH/VH.        HPI  HISTORY OF PRESENT ILLNESS:  Patient is a 19-year-old male presents the emergency department for depression and suicidal ideation.  Patient does have a plan to harm himself by laying down on the train tracks and cannot and being run over by a train.  States that he does not feel like his life has any meaning as stressors.  States that he has had prior thoughts about suicidal ideation but never required hospitalization.  Is on medications has been taking it.  He is coming in voluntarily right now.    Past Medical History: Has history of depression  Past Surgical History: Denied  Family History: family history not pertinent to presenting problem or chief complaint  Social History: Denied cigarette smoking ideologies, recreational drugs    __________________________________________________________  PHYSICAL EXAM:    Appearance: Alert, oriented , cooperative   Skin: Intact,  dry skin, no lesions, rash, petechiae or purpura.   Eyes: PERRLA, EOMs intact,  Conjunctiva pink with no redness or exudates.    HENT: Normocephalic, atraumatic. Nares patent   Neck: Supple. Trachea at midline.   Pulmonary: Lung sounds are clear bilaterally.  There is no rales, rhonchi, or wheezing.  Cardiac: Regular rate and rhythm, no rubs, murmurs, or gallops. No JVD,   Abdomen: Abdomen is soft, nontender, and nondistended.  No palpable organomegaly.  No rebound or guarding.  No CVA tenderness. Nonsurgical abdomen  Genitourinary: Exam deferred.  Musculoskeletal: no edema, pain, cyanosis, or deformity in extremities. Pulses full and equal.   Neurological:  Cranial nerves are grossly intact, grossly normal sensation, no weakness, no  focal findings identified.    __________________________________________________________  MEDICAL DECISION MAKING:    Patient presented to the emergency department for suicidal ideation.  He does have a plan to harm himself by laying down on train tracks.  Was brought in by Rogelio NARANJO.  Patient here was calm and cooperative.  Denying medical complaints.  Labs were obtained which were overall unremarkable.  Patient is medically cleared at this time.  EPAT was consulted.  I did speak to the parents after and explained to them the process for psychiatric evaluation as the patient has never experienced this before.    At 0700 hrs., patient care was signed out to the oncoming daytime physician pending EPAT evaluation recommendation.    Michael Barron  Emergency Medicine      Patient History   No past medical history on file.  No past surgical history on file.  No family history on file.  Social History     Tobacco Use    Smoking status: Not on file    Smokeless tobacco: Not on file   Substance Use Topics    Alcohol use: Not on file    Drug use: Not on file       Physical Exam   ED Triage Vitals [11/24/24 2346]   Temperature Heart Rate Respirations BP   36.9 °C (98.4 °F) 63 18 143/88      Pulse Ox Temp Source Heart Rate Source Patient Position   97 % Oral -- --      BP Location FiO2 (%)     -- --       Physical Exam      ED Course & [facility]   ED Course as of 11/25/24 0502   Mon Nov 25, 2024   0043 Patient twelve-lead EKG interpreted by myself shows sinus bradycardia with a ventricular rate of 49, borderline right axis deviation, normal NJ interval, normal QRS duration, normal QT, no STEMI. [WJ]   0422 I spoke to the parents and updated him regarding the EPAT process. [WJ]      ED Course User Index  [WJ] Michael Barron, DO                 No data recorded     [place] Coma Scale Score: 15 (11/24/24 2352 : Jc Harrell RN)                           Medical Decision Making      Procedure  Procedures     Michael Barron,  DO  11/25/24 0639

## 2024-11-25 NOTE — SIGNIFICANT EVENT
Application for Emergency Admission      Ready for Transfer?  Is the patient medically cleared for transfer to inpatient psychiatry: Yes  Has the patient been accepted to an inpatient psychiatric hospital: Yes    Application for Emergency Admission  IN ACCORDANCE WITH SE[place] 5122.10 [place]    11/25/2024 .8:28 AM    Reason for Hospitalization  The undersigned has reason to believe that: Taj Boyd Is a mentally ill person subject to hospitalization by court order under division B Section 5122.01 of the Revised Code, i.e., this person:    1.Yes  Represents a substantial risk of physical harm to self as manifested by evidence of threats of, or attempts at, suicide or serious self-inflicted bodily harm    2.No Represents a substantial risk of physical harm to others as manifested by evidence of recent homicidal or other violent behavior, evidence of recent threats that place another in reasonable fear of violent behavior and serious physical harm, or other evidence of present dangerousness    3.No Represents a substantial and immediate risk of serious physical impairment or injury to self as manifested by  evidence that the person is unable to provide for and is not providing for the person's basic physical needs because of the person's mental illness and that appropriate provision for those needs cannot be made  immediately available in the community    4.Yes Would benefit from treatment in a hospital for his mental illness and is in need of such treatment as manifested by evidence of behavior that creates a grave and imminent risk to substantial rights of others or  himself.    5.Yes Would benefit from treatment as manifested by evidence of behavior that indicates all of the following:       (a) The person is unlikely to survive safely in the community without supervision, based on a clinical determination.       (b) The person has a history of lack of compliance with treatment for mental illness and one of the  following applies:      (i) At least twice within the thirty-six months prior to the filing of an affidavit seeking court-ordered treatment of the person under section 5122.111 of the Revised Code, the lack of compliance has been a significant factor in necessitating hospitalization in a hospital or receipt of services in a forensic or other mental health unit of a correctional facility, provided that the thirty-six-month period shall be extended by the length of any hospitalization or incarceration of the person that occurred within the thirty-six-month period.      (ii) Within the forty-eight months prior to the filing of an affidavit seeking court-ordered treatment of the person under section 5122.111 of the Revised Code, the lack of compliance resulted in one or more acts of serious violent behavior toward self or others or threats of, or attempts at, serious physical harm to self or others, provided that the forty-eight-month period shall be extended by the length of any hospitalization or incarceration of the person that occurred within the forty-eight-month period.      (c) The person, as a result of mental illness, is unlikely to voluntarily participate in necessary treatment.       (d) In view of the person's treatment history and current behavior, the person is in need of treatment in order to prevent a relapse or deterioration that would be likely to result in substantial risk of serious harm to the person or others.    (e) Represents a substantial risk of physical harm to self or others if allowed to remain at liberty pending examination.    Therefore, it is requested that said person be admitted to the above named facility.    STATEMENT OF BELIEF    Must be filled out by one of the following: a psychiatrist, licensed physician, licensed clinical psychologist, health or ,  or .  (Statement shall include the circumstances under which the individual was taken into custody  and the reason for the person's belief that hospitalization is necessary. The statement shall also include a reference to efforts made to secure the individual's property at his residence if he was taken into custody there. Every reasonable and appropriate effort should be made to take this person into custody in the least conspicuous manner possible.)    Patient had reported that he is having suicidal ideations with a plan he is medically cleared     Kacey Hines MD 11/25/2024     _____________________________________________________________   Place of Employment: unknown    STATEMENT OF OBSERVATION BY PSYCHIATRIST, LICENSED PHYSICIAN, OR LICENSED CLINICAL PSYCHOLOGIST, IF APPLICABLE    Place of Observation (e.g., [facility] mental health center, general hospital, office, emergency facility)  (If applicable, please complete)    Kacey Hines MD 11/25/2024    _____________________________________________________________

## 2024-11-25 NOTE — NURSING NOTE
"Patient was admitted to the [facility] for SI. He tells this nurse he has been feeling hopeless and worthless recently. Patient reports taking Lexapro 5mg \" off and on\" for the last six months but feels it isn't working. 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 Patient rates anxiety 1-2/10 depression 3/10 with no SI/HI or AVH reported. He presents as flat, blunted and soft spoken. He was cooperative during intake . Yeni Jones NP notified of admission. Will continue to monitor for safety.  "

## 2024-11-25 NOTE — GROUP NOTE
"Group Topic: Leisure Skills   Group Date: 11/25/2024  Start Time: 1345  End Time: 1445  Facilitators: KIRK SaavedraS   Department: [facility] REHAB THERAPY VIRTUAL    Number of Participants: 1   Group Focus: leisure skills and social skills  Treatment Modality: Recreational Therapy   Interventions utilized were What Do You Mary?, exploration and leisure development  Purpose: other: cognitive skills/focus, leisure awareness, social engagement     Name: Taj Boyd YOB: 2005   MR: [id number]      Facilitator: Recreational Therapist  Level of Participation: did not attend  Progress: None  Comments: Patients were gathered to learn and participate in the game \"What Do You Mary?\". This activity works on cognitive skills, following directions, positive social interaction/teamwork, and promotes leisure awareness.    New admission. Unable to attend group activity at this time. Patient will continue to be provided with opportunities to enhance leisure skills and/or coping mechanisms.    Plan: patient will be encouraged to complete RT assessment       "

## 2024-11-25 NOTE — PROGRESS NOTES
REHAB Therapy Assessment & Treatment    Patient Name: Taj Boyd  MRN: [id number]  Today's Date: 11/25/2024      Activity Assessment:  Initial Assessment  Attention Span: 15-30 Miutes  Cognitive Behavior Status/Orientation: Person, Place, Time, Attentive, Capable  Crisis Triggers: Emotions, Mood, Other (Comment) (worsening grades at school ([employer]), negative self-talk, pt feeling friends/family hate him)  Emotional Concerns/Mood/Affect: Guarded, Anxious, Cooperative, Friendly  Hearing: Adequate  Memory: Memory intact  Motivation Level: Moderate encouragement needed  Speech/Communication/Socialization: Verbal  Vision: Adequate    Leisure Survey:   Rehab Leisure Interest Survey  Activity Preference: Independent, Group  Activity Tolerance: Fair 15-30 minutes  At Home ADL Deficits: Other (Comment) (pt is independent)  Barriers to Leisure Participation: Emotions, Mood/affect, Lack of motivation, Thought process, Lack of social skills, Low self-esteem  Community Resources: Unaware of community resources however interested in exploring  Creative Activities: Creative writing (fiction)  Education/School: [employer] at [employer] (computer programming)  Following Directions: Able to follow multi-step commands  Leisure Interests: Actively participates in leisure interests  Living Arrangement: Other (Comment) (dorms at [employer])  Motivators for Recreation/Leisure Involvement: Self-esteem/sense of accomplishment, Creative expression, Intellectual expression, Fun/entertainment, Sense of well being/contentment  Outdoor Activities: Other (Comment) (going on walks)  Passive Games: Video games  Patient/Family Education Needs: safety awareness  Patient Strengths: writing  Patient Weakness: self-confidence  Physial Activity: Other (Comment) (walks)  Social/Group Activities: Other (Comment) (time with friends/family)  Solitary Activities: Watch/listen television, Reading, Music (rock/metal music)  Special Hobbies: writing (fiction)  Transportation:  "Other (Comment) (walks)  Work/Volunteer: full-time student  Additional Comments: Pt is a 19 year old M with a history of depression, admitted due to suicidal ideation. Pt reports feeling \"worthless\" and that his friends/family are disappointed in him/hate him. Upon meeting 1:1 in room, pt was friendly and cooperative for questioning, but appeared anxious and squirmish. He was unable to identify any major stressors or triggers, only stating that his \"motivation level\" is down along with his mood. He shared that he hopes to \"achieve a better outlook on life\" from being in the hospital. Pt was informed of the daily program schedule and independent leisure activities available on the unit. He was encouraged to attend a variety of groups during his stay.           Therapeutic Recreation:         Encounter Problems       Encounter Problems (Active)       Emotional BH RT       Mood       Start:  11/25/24    Expected End:  12/02/24               Recreation  RT       Awareness       Start:  11/25/24    Expected End:  12/02/24               Social       Stimulation       Start:  11/25/24    Expected End:  12/02/24                     Education Documentation  No documentation found.  Education Comments  No comments found.                    "

## 2024-11-25 NOTE — H&P
"Physician Certification/Re-Certification: INITIAL   I certify that the inpatient psychiatric hospital admission is medically necessary for:  treatment which could reasonably be expected to improve the patient's condition that could not be provided in a less restrictive setting   I estimate the period of hospitalization are necessary for treatment of this patient will be:  7-14 days   My plans for post hospital care for this patient are:  home     The reason for admission includes:  suicidal thoughts and plan . The onset of symptoms was gradual starting one year ago with gradually worsening course since that time. Psychosocial stressors include:  school .      Chief Complaint: \"Told my friend I was having suicidal thoughts and was seriously considering following through with my plan\"    History Of Present Illness  Taj Boyd is a 19 y.o. year old male patient who presented to the Emergency Department reports feelings of depression for the past year with worsening for the past 2 weeks. He also has problems sleeping, increased appetite, decreased energy, decreased concentration increased feelings of hopelessness and helplessness and worthlessness, and anhedonia, all for the past year with worsening in the past 2 weeks. He reports suicidal ideation for the past year along with a suicide plan to lay on the train tracks and wait for a train to come by to end his life. He reports experiencing prior depressive episodes, but not manic symptoms, in the past. These symptoms have never been this bad. No hallucinations or paranoia were endorsed or noted. He does note some anxiety when in social situations but otherwise denies excessive worries about everyday activities that he can't control.        [facility]:  Patient is a 18yo male presenting to the ED via PD on pink slip with chief complaint of suicidal ideation. Reportedly, “he feels like all his friends and family hate him and are disappointed in " him. He advised he was planning on waiting by the train tracks behind over easy for a train to come by to end his life. He stated he's been wanting to hurt himself but can't get the courage to”. Patient's chart, triage, and provider note reviewed prior to assessment. Patient has a psychiatric history of Depression. He is not engaged in outpatient services but is prescribed Lexapro through his PCP. The patient denied history of admissions. He denied hx of SIB/SA, indicated high risk at triage. BAL and UTOX negative on arrival.     Upon assessment the patient remained calm, cooperative, and notably dysthymic. Patient presented as withdrawn with limited eye contact and concrete thought process. The patient reported he had been talking with a friend last night and “said something about wanting to end my life”, so his friend called Rogelio NARANJO. He endorsed explicitly stating SI with plan to get hit by the train. Patient reported having taken preparatory actions including walking downtown and “being in the area with the tracks” but he was not forthcoming in terms of intent. The patient otherwise denied HI/AVH and no delusions were elicited. Patient was unable to identify a specific trigger or stressor to current depressive episode but endorses having “struggled for a while now”. As a result of worsening depressive symptoms, patient is now also reportedly failing a number of college courses. The patient states he “gave up on school” and has not been engaging academically or socially. Patient identified his parents and friends as primary supports but reported notably poor self-esteem/image & indicated he felt like a burden to others. He reported constant negative self-talk and belief that he is overweight despite his current physique. Ultimately, patient expressed he is “glad to be here” and remained help-seeking throughout.         PSYCHIATRIC REVIEW OF SYMPTOMS  Depressive Symptoms: depressed or irritable mood, diminished  interest, weight or appetite change, insomnia or hypersomnia, fatigue or loss of energy, poor concentration or indecisiveness, and guns or weapons in household  Manic Symptoms: negative  Anxiety Symptoms: excessive worry Worry Symptoms: difficulty controlling worry and self-doubt  Psychotic Symptoms:  none  Delirium/Altered Mental Status Symptoms:  none  Other Symptoms/Concerns:  none      Past Medical History  No past medical history on file.     Code Status: Personally discussed with patient on admission, and is currently a full code.    Past Psychiatric History: 1) Past Dx: depression/anxiety                                            2) No prior psychiatric hospitalizations                                            3) No prior suicide attempts                                            4) No gun ownership. Father owns a gun that is locked up per patient.                                            5) No prior SIB                                            6) No prior rehab treatment programs                                            7) Outpt MH Tx: none per patient                                            8) Current psych meds: Lexapro 5 mg once per day    Past Psychiatric Meds: 1) Lexapro    Family History: 1) Patient denies an mental health issues in the family                             2) No known suicides in the family.    Substance Use History: 1) Tobacco - none                                          2) ETOH - none                                          3) Cannabis - none                                          4) Other drugs - none        Social History  Social History     Socioeconomic History    Marital status: Single     Spouse name: Not on file    Number of children: Not on file    Years of education: Not on file    Highest education level: Not on file   Occupational History    Not on file   Tobacco Use    Smoking status: Never    Smokeless tobacco: Never   Vaping Use    Vaping status: Never  Used   Substance and Sexual Activity    Alcohol use: Not on file    Drug use: Not on file    Sexual activity: Not on file   Other Topics Concern    Not on file   Social History Narrative    Not on file     Social Drivers of Health     Financial Resource Strain: Low Risk  (11/25/2024)    Overall Financial Resource Strain (CARDIA)     Difficulty of Paying Living Expenses: Not hard at all   Food Insecurity: No Food Insecurity (11/25/2024)    Hunger Vital Sign     Worried About Running Out of Food in the Last Year: Never true     Ran Out of Food in the Last Year: Never true   Transportation Needs: No Transportation Needs (11/25/2024)    PRAPARE - Transportation     Lack of Transportation (Medical): No     Lack of Transportation (Non-Medical): No   Physical Activity: Inactive (11/25/2024)    Exercise Vital Sign     Days of Exercise per Week: 0 days     Minutes of Exercise per Session: 0 min   Stress: Stress Concern Present (11/25/2024)    [employer] [place] [employer] - Occupational Stress Questionnaire     Feeling of Stress : Rather much   Social Connections: Moderately Isolated (11/25/2024)    Social Connection and Isolation Panel [NHANES]     Frequency of Communication with Friends and Family: Twice a week     Frequency of Social Gatherings with Friends and Family: Once a week     Attends Anabaptist Services: Never     Active Member of Clubs or Organizations: Yes     Attends Club or Organization Meetings: 1 to 4 times per year     Marital Status: Never    Intimate Partner Violence: Not At Risk (11/25/2024)    Humiliation, Afraid, Rape, and Kick questionnaire     Fear of Current or Ex-Partner: No     Emotionally Abused: No     Physically Abused: No     Sexually Abused: No   Housing Stability: High Risk (11/25/2024)    Housing Stability Vital Sign     Unable to Pay for Housing in the Last Year: No     Number of Times Moved in the Last Year: 2     Homeless in the Last Year: No        Other Social  History:  The patient graduated high school. His work history includes working at [employer]. Never . No children. No significant legal history. The patient lives in [facility] near [place] with his mother and father. He has no siblings. 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      Trauma History  Victim, Perpetrator or Witness of Abuse: No significant physical, sexual, emotional abuse, neglect or trauma history was identified on initial assessment of the patient. This shall not be an active focus of treatment, but will continue to be reassessed throughout admission. (3)    Physical Abuse: No  Sexual Abuse: No  Verbal / Emotional Abuse / Bullying (+Cyber): No   Financial Abuse: No  Domestic Violence: No      Review of Systems   Review of Systems   Constitutional: Negative.    HENT: Negative.     Eyes: Negative.    Respiratory: Negative.     Cardiovascular: Negative.    Gastrointestinal: Negative.    Endocrine: Negative.    Genitourinary: Negative.    Musculoskeletal: Negative.    Skin: Negative.    Allergic/Immunologic: Negative.    Neurological: Negative.    Hematological: Negative.    Psychiatric/Behavioral:  Positive for decreased concentration, dysphoric mood, sleep disturbance and suicidal ideas. Negative for agitation, behavioral problems, confusion, hallucinations and self-injury. The patient is nervous/anxious. The patient is not hyperactive.    All other systems reviewed and are negative.       Cranial Nerve Exam  CN II - normal  CN III - normal  CN IV - normal  CN V - normal  CN VI - normal  CN VII - normal  CN VIII - normal  CN IX - normal  CN X - normal  CN XI - normal  CN XII - normal        Physical Exam  Mental Status Exam:   General: Appropriately groomed and dressed in hospital attire.   Appearance: Appears stated age.   Attitude: Calm, cooperative, soft spoken.   Behavior: Appropriate eye contact.   Motor Activity: No agitation or  "retardation. No EPS/TD. Normal gait and station. Diminished muscle tone and bulk.   Speech: Regular rate, rhythm, volume and tone, spontaneous, fluent. Non-pressured.   Mood: \"Calm\"   Affect: Neutral.   Thought Process: Organized, linear, goal directed.   Thought Content: Does endorse suicidal ideation with a suicide plan on ED presentation.  Does not endorse homicidal ideation.  No overt delusions or paranoia elicited.    Thought Perception: Does not endorse auditory or visual hallucinations, does not appear to be responding to hallucinatory stimuli.   Cognition: Alert, oriented x 3. No deficits noted. Adequate fund of knowledge. No deficit in recent and remote memory. No deficits in attention, concentration or language.   Insight: Poor, as patient recognizes few symptoms of illness and some need for recommended treatments.    Judgment: Impaired, as patient can not make reasonable decisions about ordinary activities of daily living and necessary medical care recommendations. Suicidal thoughts and suicidal plan.         -----------------------------------------------------------------------------------------------------------------------------------------------------------------------------------------------------------------------  Abnormal Involuntary Movement Scale (AIMS)  Evaluator: Diego Hollingsworth MD  Date: 11/25/2024      Note: ratings for first three major categories. 0 = none, 1 = minimal (or be extreme normal), 2 = mild, 3 = moderate, and 4 = severe.      A) Facial and Oral Movement       1) Muscles of facial expression (e.g., movements of forehead, eyebrows, periorbital area, cheeks, include frowning, blinking, grimacing of upper face)       Rating = 0         2) Lips and perioral area (e.g., puckering, pouting, smacking)       Rating = 0         3) Jaw (e.g., biting, clenching, chewing, mouth opening, lateral movement)       Rating = 0         4) Tongue (rate only increase in movements both in and out " of mouth, not inability to sustain movement)       Rating = 0    B) Extremity Movements       5) Upper (arms, wrist, hands, fingers). Include movements that are choreic (rapid, objectively purposeless, irregular, spontaneous) or athetoid (slow, irregular, complex, serpentine). Do not include            tremor (repetitive, regular, rhythmic movements).       Rating = 0         6) Lower (legs, knees, ankles, toes). (E.g., lateral knee movement, foot tapping, heel, dropping, foot squirming, inversion and eversion of foot).       Rating = 0    C) Trunk Movements       7) Neck, shoulders, hips (e.g., rocking, twisting, squirming, pelvic gyrations, and include diaphragmatic movements)       Rating = 0    D) Global Judgments       8) Severity of abnormal movements (based on highest single score of the above items).       Rating = 0         9) Incapacitation due to abnormal movements       Rating = 0         10) Patient's awareness of abnormal movements       Rating = 0    E) Dental Status       11) Current problems with teeth and/or dentures       0-point NO         12) Does patient usually wear dentures       0-point NO      -----------------------------------------------------------------------------------------------------------------------------------------------------------------------------------------------------------------------          Functional Estimates  Estimate of Intelligence: average   Estimate of Capacity for Activities of Daily Living: independent       Last Recorded Vitals  Visit Vitals  /78 (BP Location: Left arm, Patient Position: Sitting)   Pulse 76   Temp 36.9 °C (98.4 °F)   Resp 16        Relevant Results  Results for orders placed or performed during the hospital encounter of 11/24/24 (from the past 24 hours)   DRUG SCREEN,URINE   Result Value Ref Range    Amphetamine Screen, Urine Presumptive Negative Presumptive Negative    Barbiturate Screen, Urine Presumptive Negative Presumptive  Negative    Benzodiazepines Screen, Urine Presumptive Negative Presumptive Negative    Cannabinoid Screen, Urine Presumptive Negative Presumptive Negative    Cocaine Metabolite Screen, Urine Presumptive Negative Presumptive Negative    Fentanyl Screen, Urine Presumptive Negative Presumptive Negative    Opiate Screen, Urine Presumptive Negative Presumptive Negative    Oxycodone Screen, Urine Presumptive Negative Presumptive Negative    PCP Screen, Urine Presumptive Negative Presumptive Negative    Methadone Screen, Urine Presumptive Negative Presumptive Negative   Acute Toxicology Panel, Blood   Result Value Ref Range    Acetaminophen <10.0 10.0 - 30.0 ug/mL    Salicylate  <3 4 - 20 mg/dL    Alcohol <10 <=10 mg/dL   Comprehensive Metabolic Panel   Result Value Ref Range    Glucose 113 (H) 74 - 99 mg/dL    Sodium 138 136 - 145 mmol/L    Potassium 3.8 3.5 - 5.3 mmol/L    Chloride 105 98 - 107 mmol/L    Bicarbonate 26 21 - 32 mmol/L    Anion Gap 11 10 - 20 mmol/L    Urea Nitrogen 13 6 - 23 mg/dL    Creatinine 0.93 0.50 - 1.30 mg/dL    eGFR >90 >60 mL/min/1.73m*2    Calcium 9.2 8.6 - 10.3 mg/dL    Albumin 4.9 3.4 - 5.0 g/dL    Alkaline Phosphatase 69 33 - 120 U/L    Total Protein 7.3 6.4 - 8.2 g/dL    AST 13 9 - 39 U/L    Bilirubin, Total 0.5 0.0 - 1.2 mg/dL    ALT 6 (L) 10 - 52 U/L   CBC and Auto Differential   Result Value Ref Range    WBC 6.9 4.4 - 11.3 x10*3/uL    nRBC 0.0 0.0 - 0.0 /100 WBCs    RBC 6.09 (H) 4.50 - 5.90 x10*6/uL    Hemoglobin 17.1 13.5 - 17.5 g/dL    Hematocrit 50.3 41.0 - 52.0 %    MCV 83 80 - 100 fL    MCH 28.1 26.0 - 34.0 pg    MCHC 34.0 32.0 - 36.0 g/dL    RDW 12.4 11.5 - 14.5 %    Platelets 239 150 - 450 x10*3/uL    Neutrophils % 56.7 40.0 - 80.0 %    Immature Granulocytes %, Automated 0.1 0.0 - 0.9 %    Lymphocytes % 31.8 13.0 - 44.0 %    Monocytes % 7.3 2.0 - 10.0 %    Eosinophils % 3.5 0.0 - 6.0 %    Basophils % 0.6 0.0 - 2.0 %    Neutrophils Absolute 3.91 1.20 - 7.70 x10*3/uL    Immature  Granulocytes Absolute, Automated 0.01 0.00 - 0.70 x10*3/uL    Lymphocytes Absolute 2.19 1.20 - 4.80 x10*3/uL    Monocytes Absolute 0.50 0.10 - 1.00 x10*3/uL    Eosinophils Absolute 0.24 0.00 - 0.70 x10*3/uL    Basophils Absolute 0.04 0.00 - 0.10 x10*3/uL   Urinalysis with Reflex Culture and Microscopic   Result Value Ref Range    Color, Urine Yellow Light-Yellow, Yellow, Dark-Yellow    Appearance, Urine Clear Clear    Specific Gravity, Urine 1.026 1.005 - 1.035    pH, Urine 7.0 5.0, 5.5, 6.0, 6.5, 7.0, 7.5, 8.0    Protein, Urine NEGATIVE NEGATIVE, 10 (TRACE), 20 (TRACE) mg/dL    Glucose, Urine Normal Normal mg/dL    Blood, Urine NEGATIVE NEGATIVE    Ketones, Urine NEGATIVE NEGATIVE mg/dL    Bilirubin, Urine NEGATIVE NEGATIVE    Urobilinogen, Urine 2 (1+) (A) Normal mg/dL    Nitrite, Urine NEGATIVE NEGATIVE    Leukocyte Esterase, Urine NEGATIVE NEGATIVE   Extra Urine Gray Tube   Result Value Ref Range    Extra Tube Hold for add-ons.    ECG 12 Lead   Result Value Ref Range    Ventricular Rate 49 BPM    Atrial Rate 48 BPM    MT Interval 148 ms    QRS Duration 97 ms    QT Interval 401 ms    QTC Calculation(Bazett) 362 ms    P Axis 53 degrees    R Axis 93 degrees    T Axis 37 degrees    QRS Count 8 beats    Q Onset 249 ms    T Offset 450 ms    QTC Fredericia 374 ms         Allergies  No Known Allergies    Medications  Scheduled medications     Continuous medications     PRN medications  PRN medications: alum-mag hydroxide-simeth, diphenhydrAMINE **OR** diphenhydrAMINE, haloperidol **OR** haloperidol lactate, hydrOXYzine pamoate, ibuprofen, LORazepam **OR** LORazepam, melatonin, psyllium      OARRS Report reviewed by Dr. Hollingsworth on 11- (score = 000).        PSYCHIATRIC RISK ASSESSMENT  Violence Risk Assessment: male and presence of firearms in home  Acute Risk of Harm to Others is Considered: low   Suicide Risk Assessment: access to weapons, , current psychiatric illness, feelings of hopelessness, global  insomnia, male, presence of firearms in home, suicidal behaviors, suicidal ideations, and suicidal plans  Protective Factors against Suicide: positive family relationships  Acute Risk of Harm to Self is Considered: high        Diagnostic Impression/Plan:  Assessment & Plan  Severe episode of recurrent major depressive disorder, without psychotic features (Multi)  Plan: 1) increase Lexapro 5 -> 10 mg at bedtime  2) group and milieu therapy    Discussed potential risks, benefits, and alternatives to medications with patient, who consented to the above medications.          OCTAVIO TorresS

## 2024-11-25 NOTE — ASSESSMENT & PLAN NOTE
Plan: 1) increase Lexapro 5 -> 10 mg at bedtime  2) group and milieu therapy    Discussed potential risks, benefits, and alternatives to medications with patient, who consented to the above medications.

## 2024-11-26 LAB
25(OH)D3 SERPL-MCNC: 26 NG/ML (ref 30–100)
CHOLEST SERPL-MCNC: 133 MG/DL (ref 0–199)
CHOLESTEROL/HDL RATIO: 3.6
GLUCOSE P FAST SERPL-MCNC: 85 MG/DL (ref 74–99)
HDLC SERPL-MCNC: 36.8 MG/DL
LDLC SERPL CALC-MCNC: 87 MG/DL
NON HDL CHOLESTEROL: 96 MG/DL (ref 0–119)
TRIGL SERPL-MCNC: 47 MG/DL (ref 0–114)
VLDL: 9 MG/DL (ref 0–40)

## 2024-11-26 PROCEDURE — 97150 GROUP THERAPEUTIC PROCEDURES: CPT | Mod: GO,CO

## 2024-11-26 PROCEDURE — 2500000001 HC RX 250 WO HCPCS SELF ADMINISTERED DRUGS (ALT 637 FOR MEDICARE OP): Performed by: PSYCHIATRY & NEUROLOGY

## 2024-11-26 PROCEDURE — 82947 ASSAY GLUCOSE BLOOD QUANT: CPT | Performed by: PSYCHIATRY & NEUROLOGY

## 2024-11-26 PROCEDURE — 97165 OT EVAL LOW COMPLEX 30 MIN: CPT | Mod: GO

## 2024-11-26 PROCEDURE — 36415 COLL VENOUS BLD VENIPUNCTURE: CPT | Performed by: PSYCHIATRY & NEUROLOGY

## 2024-11-26 PROCEDURE — 82306 VITAMIN D 25 HYDROXY: CPT | Mod: GEALAB | Performed by: PSYCHIATRY & NEUROLOGY

## 2024-11-26 PROCEDURE — 99233 SBSQ HOSP IP/OBS HIGH 50: CPT | Performed by: PSYCHIATRY & NEUROLOGY

## 2024-11-26 PROCEDURE — 1240000001 HC SEMI-PRIVATE BH ROOM DAILY

## 2024-11-26 PROCEDURE — 80061 LIPID PANEL: CPT | Performed by: PSYCHIATRY & NEUROLOGY

## 2024-11-26 RX ORDER — ESCITALOPRAM OXALATE 10 MG/1
15 TABLET ORAL NIGHTLY
Status: DISCONTINUED | OUTPATIENT
Start: 2024-11-26 | End: 2024-11-27

## 2024-11-26 RX ADMIN — ESCITALOPRAM OXALATE 15 MG: 10 TABLET ORAL at 20:16

## 2024-11-26 SDOH — SOCIAL STABILITY: SOCIAL INSECURITY: HAVE YOU HAD ANY THOUGHTS OF HARMING ANYONE ELSE?: NO

## 2024-11-26 SDOH — SOCIAL STABILITY: SOCIAL INSECURITY: DOES ANYONE TRY TO KEEP YOU FROM HAVING/CONTACTING OTHER FRIENDS OR DOING THINGS OUTSIDE YOUR HOME?: NO

## 2024-11-26 SDOH — SOCIAL STABILITY: SOCIAL INSECURITY: DO YOU FEEL ANYONE HAS EXPLOITED OR TAKEN ADVANTAGE OF YOU FINANCIALLY OR OF YOUR PERSONAL PROPERTY?: NO

## 2024-11-26 SDOH — SOCIAL STABILITY: SOCIAL INSECURITY: ARE THERE ANY APPARENT SIGNS OF INJURIES/BEHAVIORS THAT COULD BE RELATED TO ABUSE/NEGLECT?: NO

## 2024-11-26 SDOH — SOCIAL STABILITY: SOCIAL INSECURITY: DO YOU FEEL UNSAFE GOING BACK TO THE PLACE WHERE YOU ARE LIVING?: NO

## 2024-11-26 SDOH — SOCIAL STABILITY: SOCIAL INSECURITY: ARE YOU OR HAVE YOU BEEN THREATENED OR ABUSED PHYSICALLY, EMOTIONALLY, OR SEXUALLY BY ANYONE?: NO

## 2024-11-26 SDOH — SOCIAL STABILITY: SOCIAL INSECURITY: HAS ANYONE EVER THREATENED TO HURT YOUR FAMILY OR YOUR PETS?: NO

## 2024-11-26 SDOH — HEALTH STABILITY: MENTAL HEALTH: EXPERIENCED ANY OF THE FOLLOWING LIFE EVENTS: SOCIAL LOSS (BANKRUPTCY, DIVORCE, WORK-RELATED STRESS)

## 2024-11-26 SDOH — SOCIAL STABILITY: SOCIAL INSECURITY: HAVE YOU HAD THOUGHTS OF HARMING ANYONE ELSE?: NO

## 2024-11-26 SDOH — SOCIAL STABILITY: SOCIAL INSECURITY: ABUSE: ADULT

## 2024-11-26 SDOH — SOCIAL STABILITY: SOCIAL INSECURITY: WERE YOU ABLE TO COMPLETE ALL THE BEHAVIORAL HEALTH SCREENINGS?: YES

## 2024-11-26 ASSESSMENT — ACTIVITIES OF DAILY LIVING (ADL): LACK_OF_TRANSPORTATION: NO

## 2024-11-26 ASSESSMENT — LIFESTYLE VARIABLES
SUBSTANCE_ABUSE_PAST_12_MONTHS: NO
HOW OFTEN DO YOU HAVE 6 OR MORE DRINKS ON ONE OCCASION: NEVER
PRESCIPTION_ABUSE_PAST_12_MONTHS: NO
AUDIT-C TOTAL SCORE: 0
SKIP TO QUESTIONS 9-10: 1
AUDIT-C TOTAL SCORE: 0
HOW OFTEN DO YOU HAVE A DRINK CONTAINING ALCOHOL: NEVER
HOW MANY STANDARD DRINKS CONTAINING ALCOHOL DO YOU HAVE ON A TYPICAL DAY: PATIENT DOES NOT DRINK

## 2024-11-26 ASSESSMENT — COLUMBIA-SUICIDE SEVERITY RATING SCALE - C-SSRS
2. HAVE YOU ACTUALLY HAD ANY THOUGHTS OF KILLING YOURSELF?: NO
1. SINCE LAST CONTACT, HAVE YOU WISHED YOU WERE DEAD OR WISHED YOU COULD GO TO SLEEP AND NOT WAKE UP?: NO
6. HAVE YOU EVER DONE ANYTHING, STARTED TO DO ANYTHING, OR PREPARED TO DO ANYTHING TO END YOUR LIFE?: NO
1. SINCE LAST CONTACT, HAVE YOU WISHED YOU WERE DEAD OR WISHED YOU COULD GO TO SLEEP AND NOT WAKE UP?: NO
2. HAVE YOU ACTUALLY HAD ANY THOUGHTS OF KILLING YOURSELF?: NO
6. HAVE YOU EVER DONE ANYTHING, STARTED TO DO ANYTHING, OR PREPARED TO DO ANYTHING TO END YOUR LIFE?: NO

## 2024-11-26 ASSESSMENT — PAIN SCALES - GENERAL
PAINLEVEL_OUTOF10: 0 - NO PAIN
PAINLEVEL_OUTOF10: 0 - NO PAIN

## 2024-11-26 ASSESSMENT — PAIN - FUNCTIONAL ASSESSMENT: PAIN_FUNCTIONAL_ASSESSMENT: 0-10

## 2024-11-26 NOTE — SIGNIFICANT EVENT
11/26/24 1313   Discharge Planning   Living Arrangements Other (Comment)  (roommate in college dorm)   Support Systems Parent;Friends/neighbors   Assistance Needed Independent   Type of Residence Private residence   Do you have animals or pets at home? No   Who is requesting discharge planning? Provider   Home or Post Acute Services Community services   Expected Discharge Disposition Home   Does the patient need discharge transport arranged? No   RoundTrip coordination needed? No   Has discharge transport been arranged? No   Financial Resource Strain   How hard is it for you to pay for the very basics like food, housing, medical care, and heating? Not hard   Housing Stability   In the last 12 months, was there a time when you were not able to pay the mortgage or rent on time? N   At any time in the past 12 months, were you homeless or living in a shelter (including now)? N   Transportation Needs   In the past 12 months, has lack of transportation kept you from medical appointments or from getting medications? no   In the past 12 months, has lack of transportation kept you from meetings, work, or from getting things needed for daily living? 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 Parents are planning to bring him back home at [place] and resume school at a later date once he has stabilized with OP therapy.

## 2024-11-26 NOTE — CONSULTS
History Of Present Illness  Taj Boyd is a 19 y.o. male with a past medical history of premature birth, abdominal surgery for closure of PEG tube hole, anxiety and depression on escitalopram who was admitted to the behavioral health unit for severe episode of recurrent major depression.  Medicine was consulted for medical management of depression and anxiety.  Patient was seen and examined in the behavioral health unit.  He states that he has been feeling depressed.  He denies headache, dizziness, chest pain, palpitation, difficulty breathing, nausea, vomiting, abdominal pain, melena, hematochezia, hematuria, leg pain or leg swelling, fever or chills..  He states that his bowel movements are fine.      Past Medical History  As stated above in the HPI    Surgical History as stated above in the HPI  He has no past surgical history on file.     Social History  He reports that he has never smoked. He has never used smokeless tobacco. No history on file for alcohol use and drug use.  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    Family History  No family history on file.     Allergies  Patient has no known allergies.    Medications  Scheduled medications  escitalopram, 10 mg, oral, Nightly      Continuous medications     PRN medications  PRN medications: alum-mag hydroxide-simeth, diphenhydrAMINE **OR** diphenhydrAMINE, haloperidol **OR** haloperidol lactate, hydrOXYzine pamoate, ibuprofen, LORazepam **OR** LORazepam, melatonin, psyllium    Review of systems: 10-point review of systems is negative.     Physical Exam  Constitutional: Underweight, alert and oriented x 3, awake, cooperative, no acute distress  Skin: warm and dry  Head/Neck: Normocephalic, atraumatic  Eyes: clear sclera  ENMT: mucous membranes moist  Cardio: Regular rate and rhythm  Resp: CTA bilaterally, good respiratory effort  Gastrointestinal: Soft, nontender, nondistended, bowel sounds present  Musculoskeletal: ROM intact, no  joint swelling  Extremities: No edema, cyanosis, or clubbing  Neuro: lert and oriented x 3, sensation is intact.  Patient moves all limbs against resistance.  Psychological: Depressed     Last Recorded Vitals  /68 (BP Location: Right arm, Patient Position: Standing)   Pulse 103   Temp 36.5 °C (97.7 °F) (Temporal)   Resp 16   Wt 50.9 kg (112 lb 3.4 oz)   SpO2 91%     Relevant Results  Admission on 11/24/2024, Discharged on 11/25/2024   Component Date Value Ref Range Status    Ventricular Rate 11/25/2024 49  BPM Preliminary    Atrial Rate 11/25/2024 48  BPM Preliminary    NE Interval 11/25/2024 148  ms Preliminary    QRS Duration 11/25/2024 97  ms Preliminary    QT Interval 11/25/2024 401  ms Preliminary    QTC Calculation(Bazett) 11/25/2024 362  ms Preliminary    P Axis 11/25/2024 53  degrees Preliminary    R Axis 11/25/2024 93  degrees Preliminary    T Axis 11/25/2024 37  degrees Preliminary    QRS Count 11/25/2024 8  beats Preliminary    Q Onset 11/25/2024 249  ms Preliminary    T Offset 11/25/2024 450  ms Preliminary    QTC Fredericia 11/25/2024 374  ms Preliminary    Amphetamine Screen, Urine 11/25/2024 Presumptive Negative  Presumptive Negative Final    CUTOFF LEVEL: 500 NG/ML   Cross-reactivity has been reported with high concentrations   of the following drugs: buproprion, chloroquine, chlorpromazine,   ephedrine, mephentermine, fenfluramine, phentermine,   phenylpropanolamine, pseudoephedrine, and propranolol.    Barbiturate Screen, Urine 11/25/2024 Presumptive Negative  Presumptive Negative Final    CUTOFF LEVEL: 200 NG/ML    Benzodiazepines Screen, Urine 11/25/2024 Presumptive Negative  Presumptive Negative Final    CUTOFF LEVEL: 200 NG/ML    Cannabinoid Screen, Urine 11/25/2024 Presumptive Negative  Presumptive Negative Final    CUTOFF LEVEL: 50 NG/ML    Cocaine Metabolite Screen, Urine 11/25/2024 Presumptive Negative  Presumptive Negative Final    CUTOFF LEVEL: 150 NG/ML    Fentanyl Screen,  Urine 11/25/2024 Presumptive Negative  Presumptive Negative Final    CUTOFF LEVEL: 5 NG/ML    Opiate Screen, Urine 11/25/2024 Presumptive Negative  Presumptive Negative Final    CUTOFF LEVEL: 300 NG/ML  The opiate screen does not detect fentanyl, meperidine, or   tramadol. Oxycodone is not consistently detected (refer to  Oxycodone Screen, Urine result).    Oxycodone Screen, Urine 11/25/2024 Presumptive Negative  Presumptive Negative Final    CUTOFF LEVEL: 100 NG/ML  This test will accurately detect both oxycodone and oxymorphone.    PCP Screen, Urine 11/25/2024 Presumptive Negative  Presumptive Negative Final    CUTOFF LEVEL:  25 NG/ML  Cross-reactivity has been reported with dextromethorphan.    Methadone Screen, Urine 11/25/2024 Presumptive Negative  Presumptive Negative Final    CUTOFF LEVEL: 150 NG/ML  The metabolite L-alpha-acetylmethadol (LAAM) is not  detected by this method in concentrations that would  be found in the urine of patients on LAAM therapy.    Acetaminophen 11/25/2024 <10.0  10.0 - 30.0 ug/mL Final    Salicylate  11/25/2024 <3  4 - 20 mg/dL Final    Alcohol 11/25/2024 <10  <=10 mg/dL Final    Glucose 11/25/2024 113 (H)  74 - 99 mg/dL Final    Sodium 11/25/2024 138  136 - 145 mmol/L Final    Potassium 11/25/2024 3.8  3.5 - 5.3 mmol/L Final    Chloride 11/25/2024 105  98 - 107 mmol/L Final    Bicarbonate 11/25/2024 26  21 - 32 mmol/L Final    Anion Gap 11/25/2024 11  10 - 20 mmol/L Final    Urea Nitrogen 11/25/2024 13  6 - 23 mg/dL Final    Creatinine 11/25/2024 0.93  0.50 - 1.30 mg/dL Final    eGFR 11/25/2024 >90  >60 mL/min/1.73m*2 Final    Calculations of estimated GFR are performed using the 2021 CKD-EPI Study Refit equation without the race variable for the IDMS-Traceable creatinine methods.  https://jasn.asnjournals.org/content/early/2021/09/22/ASN.2068461124    Calcium 11/25/2024 9.2  8.6 - 10.3 mg/dL Final    Albumin 11/25/2024 4.9  3.4 - 5.0 g/dL Final    Alkaline Phosphatase 11/25/2024  69  33 - 120 U/L Final    Total Protein 11/25/2024 7.3  6.4 - 8.2 g/dL Final    AST 11/25/2024 13  9 - 39 U/L Final    Bilirubin, Total 11/25/2024 0.5  0.0 - 1.2 mg/dL Final    ALT 11/25/2024 6 (L)  10 - 52 U/L Final    Patients treated with Sulfasalazine may generate falsely decreased results for ALT.    WBC 11/25/2024 6.9  4.4 - 11.3 x10*3/uL Final    nRBC 11/25/2024 0.0  0.0 - 0.0 /100 WBCs Final    RBC 11/25/2024 6.09 (H)  4.50 - 5.90 x10*6/uL Final    Hemoglobin 11/25/2024 17.1  13.5 - 17.5 g/dL Final    Hematocrit 11/25/2024 50.3  41.0 - 52.0 % Final    MCV 11/25/2024 83  80 - 100 fL Final    MCH 11/25/2024 28.1  26.0 - 34.0 pg Final    MCHC 11/25/2024 34.0  32.0 - 36.0 g/dL Final    RDW 11/25/2024 12.4  11.5 - 14.5 % Final    Platelets 11/25/2024 239  150 - 450 x10*3/uL Final    Neutrophils % 11/25/2024 56.7  40.0 - 80.0 % Final    Immature Granulocytes %, Automated 11/25/2024 0.1  0.0 - 0.9 % Final    Immature Granulocyte Count (IG) includes promyelocytes, myelocytes and metamyelocytes but does not include bands. Percent differential counts (%) should be interpreted in the context of the absolute cell counts (cells/UL).    Lymphocytes % 11/25/2024 31.8  13.0 - 44.0 % Final    Monocytes % 11/25/2024 7.3  2.0 - 10.0 % Final    Eosinophils % 11/25/2024 3.5  0.0 - 6.0 % Final    Basophils % 11/25/2024 0.6  0.0 - 2.0 % Final    Neutrophils Absolute 11/25/2024 3.91  1.20 - 7.70 x10*3/uL Final    Percent differential counts (%) should be interpreted in the context of the absolute cell counts (cells/uL).    Immature Granulocytes Absolute, Au* 11/25/2024 0.01  0.00 - 0.70 x10*3/uL Final    Lymphocytes Absolute 11/25/2024 2.19  1.20 - 4.80 x10*3/uL Final    Monocytes Absolute 11/25/2024 0.50  0.10 - 1.00 x10*3/uL Final    Eosinophils Absolute 11/25/2024 0.24  0.00 - 0.70 x10*3/uL Final    Basophils Absolute 11/25/2024 0.04  0.00 - 0.10 x10*3/uL Final    Color, Urine 11/25/2024 Yellow  Light-Yellow, Yellow, Dark-Yellow  Final    Appearance, Urine 11/25/2024 Clear  Clear Final    Specific Gravity, Urine 11/25/2024 1.026  1.005 - 1.035 Final    pH, Urine 11/25/2024 7.0  5.0, 5.5, 6.0, 6.5, 7.0, 7.5, 8.0 Final    Protein, Urine 11/25/2024 NEGATIVE  NEGATIVE, 10 (TRACE), 20 (TRACE) mg/dL Final    Glucose, Urine 11/25/2024 Normal  Normal mg/dL Final    Blood, Urine 11/25/2024 NEGATIVE  NEGATIVE Final    Ketones, Urine 11/25/2024 NEGATIVE  NEGATIVE mg/dL Final    Bilirubin, Urine 11/25/2024 NEGATIVE  NEGATIVE Final    Urobilinogen, Urine 11/25/2024 2 (1+) (A)  Normal mg/dL Final    Due to a manufacturing issue, low positive urobilinogen results may be falsely positive. Correlate with urine bilirubin and additional clinical/laboratory findings to assess the risk of hemolytic anemia or liver disease. If clinically indicated, repeat testing with an alternate method is available by contacting the laboratory within 24 hours.    Some pigments and medications may cause a false positive urobilinogen.    Nitrite, Urine 11/25/2024 NEGATIVE  NEGATIVE Final    Leukocyte Esterase, Urine 11/25/2024 NEGATIVE  NEGATIVE Final    Extra Tube 11/25/2024 Hold for add-ons.   Final    Auto resulted.      ECG 12 Lead    Result Date: 11/25/2024  Sinus bradycardia Consider right ventricular hypertrophy       Assessment/Plan   Assessment & Plan  Severe episode of recurrent major depressive disorder, without psychotic features (Multi)    Underweight    Taj Boyd is a 19 y.o. male with a past medical history of premature birth, abdominal surgery for closure of PEG tube hole, anxiety and depression on escitalopram who was admitted to the behavioral health unit for severe episode of recurrent major depression.      Major depression  -He is known to have history of anxiety and depression  -Follow-up with psychiatry  -Continue supportive care    Underweight  -Albumin is within normal limit  -There is no electrolyte abnormality  -Will encourage oral intake  -Follow-up  with nutritionist    DVT prophylaxis  -Ambulation           Faustino López MD

## 2024-11-26 NOTE — SIGNIFICANT EVENT
11/26/24 1312   Able to Complete Psychiatric Screening   Were you able to complete all the behavioral health screenings? Yes   Abuse Screen   Abuse Screen Adult   Have you had any thoughts of harming anyone else? No   Are you or have you been threatened or abused physically, emotionally, or sexually by anyone? No   Has anyone ever threatened to hurt your family or your pets? No   Does anyone try to keep you from having/contacting other friends or doing things outside your home? No   Do you feel UNSAFE going back to the place where you are living? No   Do you feel anyone has exploited or taken advantage of you financially or of your personal property? No   Are there any apparent signs of injuries/behaviors that could be related to abuse/neglect? No   Trauma/Abuse Assessment   Physical Abuse Denies, provider concerned (Comment)   Verbal Abuse Denies, provider concerned (Comment)   Experienced Any of the Following Life Events Social loss (Bankruptcy, divorce, work-related stress)   Drug Screening   Have you used any substances (canabis, cocaine, heroin, hallucinogens, inhalants, etc.) in the past 12 months? No   Have you used any prescription drugs other than prescribed in the past 12 months? No   Is a toxicology screen needed? Yes   Audit Alcohol Screening   Q1: How often do you have a drink containing alcohol? Never   Q2: How many drinks containing alcohol do you have on a typical day when you are drinking? None   Q3: How often do you have six or more drinks on one occasion? Never   Audit-C Score 0   Over the past 2 weeks, how often have you been bothered by any of the following problems?   Little interest or pleasure in doing things Almost all   Feeling down, depressed, or hopeless Almost all   Have you had thoughts of harming anyone else? No   Values/Beliefs   Cultural Requests During Hospitalization na   Spiritual Requests During Hospitalization na   Patient Strengths/Barriers   Strengths (Must Choose Two)  Adequate financial resources;Support from family;Recreational/leisure/hobbies;Education;Stable housing   Barriers Support of organized community;Support from friends;Technical/vocational   Consults    Consult Needed Yes (Comment)   Spiritual Care Consult Needed No     Inpatient Social Work Psychiatric Assessment     Arrival Details  Mode of Arrival: Ambulance  Admission Source:  [facility] ED  Admission Type: voluntary     History of Present Illness  Admission Reason: Suicidal Ideation  HPI:  (Per EPAT):  Patient is a 20yo male presenting to the ED via PD on pink slip with chief complaint of suicidal ideation. Reportedly, “he feels like all his friends and family hate him and are disappointed in him. He advised he was planning on waiting by the train tracks behind over easy for a train to come by to end his life. He stated he's been wanting to hurt himself but can't get the courage to”. Patient's chart, triage, and provider note reviewed prior to assessment. Patient has a psychiatric history of Depression. He is not engaged in outpatient services but is prescribed Lexapro through his PCP. The patient denied history of admissions. He denied hx of SIB/SA, indicated high risk at triage. BAL and UTOX negative on arrival.    Upon assessment the patient remained calm, cooperative, and notably dysthymic. Patient presented as withdrawn with limited eye contact and concrete thought process. The patient reported he had been talking with a friend last night and “said something about wanting to end my life”, so his friend called Rogelio NARANJO. He endorsed explicitly stating SI with plan to get hit by the train. Patient reported having taken preparatory actions including walking downtown and “being in the area with the tracks” but he was not forthcoming in terms of intent. The patient otherwise denied HI/AVH and no delusions were elicited. Patient was unable to identify a specific trigger or stressor to current  depressive episode but endorses having “struggled for a while now”. As a result of worsening depressive symptoms, patient is now also reportedly failing a number of college courses. The patient states he “gave up on school” and has not been engaging academically or socially. Patient identified his parents and friends as primary supports but reported notably poor self-esteem/image & indicated he felt like a burden to others. He reported constant negative self-talk and belief that he is overweight despite his current physique. Ultimately, patient expressed he is “glad to be here” and remained help-seeking throughout.      SW Readmission Information   Readmission within 30 Days: No     Psychiatric Symptoms  Anxiety Symptoms: No problems reported or observed.  Depression Symptoms: Feelings of helplessness, Feelings of hopelessness, Feelings of worthlessness, Isolative, Loss of interest  Bessie Symptoms: No problems reported or observed.     Psychosis Symptoms  Hallucination Type: No problems reported or observed.  Delusion Type: No problems reported or observed.     Additional Symptoms - Adult  Generalized Anxiety Disorder: No problems reported or observed.  Obsessive Compulsive Disorder: No problems reported or observed.  Panic Attack: No problems reported or observed.  Post Traumatic Stress Disorder: No problems reported or observed.  Delirium: No problems reported or observed.     Past Psychiatric History/Meds/Treatments  Past Psychiatric History: Psychiatric Diagnosis: Depression   Past Psychiatric Meds/Treatments: Lexapro 5mg; no prior admissions  Past Violence/Victimization History: None reported     Current Mental Health Contacts   Name/Phone Number: none   Last Appointment Date: none  Provider Name/Phone Number: Dr. Engel (PCP)  Provider Last Appointment Date: 6/3/24     Support System: Immediate family, Friends     Living Arrangement:  (lives on campus in dorm)     Home Safety  Feels Safe  Living in Home: Yes     Income Information  Employment Status for: Patient  Employment Status: Unemployed/student  Income Source: Family      Service/Education History  Current or Previous  Service: None  Education Level:  (currently freshman in college)  History of Learning Problems: No/IEP for social in school/graduated HS  [employer]  History of School Behavior Problems: No     Social/Cultural History  Social History: Pt was born and raised in [place], [place] by both parents. He is an only child. 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 He has always had issues with eating and gaining weight as a result. Pt is single, no children, no romantic relationships so far. Pt had an IEP in school for social awkwardness. He has never had many friends. He taught himself to read at the age of 2. He is failing all his classes currently at [employer] studying computer engineering. Maternal uncle attempted suicide in . Another maternal uncle is homeless with unknown mental health issues.  Cultural Requests During Hospitalization: none  Spiritual Requests During Hospitalization: none  Important Activities: video games, creative writing     Legal  Legal Considerations: Patient/ Family Capacity to Make Sound Judgments  Criminal Activity/ Legal Involvement Pertinent to Current Situation/ Hospitalization: None     Drug Screening  Have you used any substances (cannabis, cocaine, heroin, hallucinogens, inhalants, etc.) in the past 12 months?: No  Have you used any prescription drugs other than prescribed in the past 12 months?: No  Is a toxicology screen needed?: Yes     Stage of Change  Stage of Change:  (n/a)     Behavioral Health  Behavioral Health(WDL): Within Defined Limits  Behaviors/Mood: Calm, Cooperative  Affect: Appropriate to circumstances     Orientation  Orientation Level: Oriented X4     General Appearance  Motor Activity: Unremarkable  Speech Pattern: Excessively  soft  General Attitude: Attentive, Cooperative, And Pleasant  Appearance/Hygiene: Disheveled     Thought Process  Coherency: [place] thinking  Content: Unremarkable  Delusions:  (None)  Perception: Not altered  Hallucination: None  Judgment/Insight: Impaired  Confusion: None  Cognition: Appropriate attention/concentration, appropriate safety awareness, Poor judgement     Sleep Pattern  Sleep Pattern: Naps during the day, Difficulty falling asleep     Risk Factors  Self-Harm/Suicidal Ideation Plan: SI with plan to lie on train tracks; preparatory behavior taken last night  Previous Self Harm/Suicidal Plans: Denies  Risk Factors: Male, Major mental illness     Violence Risk Assessment  Assessment of Violence: None noted  Thoughts of Harm to Others: No     Ability to Assess Risk Screen  Risk Screen - Ability to Assess: Able to be screened  Evangeline Suicide Severity Rating Scale (Screener/Recent Self-Report)  1. Wish to be Dead (Past 1 Month): Yes  2. Non-Specific Active Suicidal Thoughts (Past 1 Month): Yes  3. Active Suicidal Ideation with any Methods (Not Plan) Without Intent to Act (Past 1 Month): Yes  4. Active Suicidal Ideation with Some Intent to Act, Without Specific Plan (Past 1 Month): Yes  5. Active Suicidal Ideation with Specific Plan and Intent (Past 1 Month): Yes  6. Suicidal Behavior (Lifetime): Yes  6. Suicidal Behavior (3 Months): Yes  Calculated C-SSRS Risk Score (Lifetime/Recent): High Risk  Step 1: Risk Factors  Current & Past Psychiatric Dx: Mood disorder  Presenting Symptoms: Anhedonia, Impulsivity, Hopelessness or despair  Precipitants/Stressors: Triggering events leading to humiliation, shame, and/or despair (e.g. loss of relationship, financial or health status) (real or anticipated), inadequate social supports, Perceived burden on others  Change in Treatment: Hopeless or dissatisfied with provider or treatment  Access to Lethal Methods : No (Pt reports his father has a firearm at family  "home)  Step 2: Protective Factors   Protective Factors Internal: Fear of death or the actual act of killing self  Protective Factors External: Supportive social network or family or friends  Step 3: Suicidal Ideation Intensity  How Many Times Have You Had These Thoughts: Daily or almost daily  When You Have the Thoughts How Long do They Last : 4-8 hours/most of the day  Could/Can You Stop Thinking About Killing Yourself or Wanting to Die if You Want to: Unable to control thoughts  Are There Things - Anyone or Anything - That Stopped You From Wanting to Die or Acting on: Uncertain that deterrents stopped you  What Sort of Reasons Did You Have For Thinking About Wanting to Die or Killing Yourself: Mostly to end or stop the pain (you couldn't go on living with the pain or how you were feeling)  Total Score: 20  Step 5: Documentation  Risk Level: Moderate suicide risk (Patient is moderate acute risk in the setting of plan for inpatient admission given lack of access to lethal means on unit. Discussed with Dr. Hines)     Psychiatric Impression and Plan of Care  Assessment and Plan:    Taj is a 18 y/o CM admitted to [facility] for SI with a plan to jump in front of a train. He has no primary psych history. PCP prescribed him 5 MG Lexapro at the end of spring semester for \"anxiety\". He has never seen a therapist. He is currently in his second year at [employer]. He is still a freshman because he has been failing his courses. Prior to assessment, pt's provider notes, triage notes, and community record were reviewed. Pt presents as very soft spoken, blunted, cooperative, guarded, help seeking, socially awkward, poor eye contact. He does not identify any triggering events. He reports feeling suicidal with plan for about a week and identifies passive SI for about a year to year and a half. He reports he has been sleeping more, missing classes, not doing homework. He does not have many friends and never did. Pt signed a voluntary " "application for admission and a DIANE to speak with parents for collateral. Met with parents afterwards, off unit, prior to visitation. Parents report that pt has always been \"socially awkward\" and has always struggled to make friends, never wanted to be involved in group activities, as a child he did well with adults only. He had an IEP in school for social awkwardness, but was a straight A student. He does well with structure. Mom thinks the Lexapro helped at first, but when he went back to school this fall he struggled. Parents had no idea how bad his grades were or that he was struggling until this incident. Parents plan to contact the school and have pt drop all his courses this semester and return home with them upon dc to address his mental health needs. He has had one job in the past, last summer at [employer]. He did alright with working with the customers, but did not know his co-workers names. Mom had to force him to get his drivers license. Parents are concerned that he does not understand the value of money. Pt denies SI, HI, AVH at time of interview. Pt and parents report no history of trauma.    Stabilize; medications per MD order  Milieu therapy  OT consult  Obtain collateral  Link with community mental health  DC plan home with parents  "

## 2024-11-26 NOTE — CARE PLAN
"The patient's goals for the shift include \"improve my outlook on life and attend groups\"    The clinical goals for the shift include maintain safety      Problem: Fall/Injury  Goal: Not fall by end of shift  Outcome: Progressing  Goal: Be free from injury by end of the shift  Outcome: Progressing  Goal: Verbalize understanding of personal risk factors for fall in the hospital  Outcome: Progressing  Goal: Verbalize understanding of risk factor reduction measures to prevent injury from fall in the home  Outcome: Progressing  Goal: Use assistive devices by end of the shift  Outcome: Progressing  Goal: Pace activities to prevent fatigue by end of the shift  Outcome: Progressing     Problem: Sensory Perceptual Alteration as Evidenced by  Goal: Cooperates with admission process  Outcome: Progressing  Goal: Patient/Family participate in treatment and discharge plans  Outcome: Progressing  Goal: Patient/Family verbalizes awareness of resources  Outcome: Progressing  Goal: Participates in unit activities  Outcome: Progressing  Goal: Discusses signs/symptoms of illness/treatment options  Outcome: Progressing  Goal: Initiates reality-based interactions  Outcome: Progressing  Goal: Able to discuss content of hallucinations/delusions  Outcome: Progressing  Goal: Notifies staff when experiencing hallucinations/delusions  Outcome: Progressing  Goal: Verbalizes reduction in hallucinations/delusions  Outcome: Progressing  Goal: Will not act on psychotic perception  Outcome: Progressing  Goal: Understands least restrictive measures  Outcome: Progressing  Goal: Free from restraint events  Outcome: Progressing     Problem: Altered Thought Processes as Evidenced by  Goal: STG - Desires improvement in ability to think and concentrate  Outcome: Progressing  Goal: STG - Participates in Occupational Therapy and other cognitive assessments  Outcome: Progressing     Problem: Potential for Harm to Self or Others  Goal: Cooperates with " admission process  Outcome: Progressing  Goal: Participates in unit activities  Outcome: Progressing  Goal: Patient/Family participate in treatment and discharge plans  Outcome: Progressing  Goal: Identifies deescalation techniques  Outcome: Progressing  Goal: Understands least restrictive measures  Outcome: Progressing  Goal: Identifies stressors that lead to harmful behaviors  Outcome: Progressing  Goal: Notifies staff when experiencing harmful thoughts toward self/others  Outcome: Progressing  Goal: Denies harm toward self or others  Outcome: Progressing  Goal: Free from restraint events  Outcome: Progressing     Problem: Educational/Scholastic Disruption  Goal: Meets educational requirements during hospitalization  Outcome: Progressing  Goal: Attends class without disruptive behavior  Outcome: Progressing  Goal: Completes daily assignments  Outcome: Progressing     Problem: Ineffective Coping  Goal: Cooperates with admission process  Outcome: Progressing  Goal: Identifies ineffective coping skills  Outcome: Progressing  Goal: Identifies healthy coping skills  Outcome: Progressing  Goal: Demonstrates healthy coping skills  Outcome: Progressing  Goal: Participates in unit activities  Outcome: Progressing  Goal: Patient/Family participate in treatment and discharge plans  Outcome: Progressing  Goal: Patient/Family verbalizes awareness of resources  Outcome: Progressing  Goal: Understands least restrictive measures  Outcome: Progressing  Goal: Free from restraint events  Outcome: Progressing     Problem: Alteration in Sleep  Goal: STG - Reports nightly sleep, duration, and quality  Outcome: Progressing  Goal: STG - Identifies sleep hygiene aids  Outcome: Progressing  Goal: STG - Informs staff if unable to sleep  Outcome: Progressing  Goal: STG - Attends breathing and relaxation group  Outcome: Progressing     Problem: Potential for Substance Withdrawal  Goal: Verbalizes signs/symptoms of withdrawal  Outcome:  Progressing  Goal: Reports signs/symptoms of withdrawal  Outcome: Progressing  Goal: Free of withdrawal symptoms  Outcome: Progressing     Problem: Anxiety  Goal: Patient/family understands admission protocols  Outcome: Progressing  Goal: Attempts to manage anxiety with help  Outcome: Progressing  Goal: Verbalizes ways to manage anxiety  Outcome: Progressing  Goal: Implements measures to reduce anxiety  Outcome: Progressing  Goal: Free from restraint events  Outcome: Progressing     Problem: Self Care Deficit  Goal: STG - Patient completes hygiene  Outcome: Progressing  Goal: Increase group attendance  Outcome: Progressing  Goal: Accepts need for medications  Outcome: Progressing  Goal: STG - Goes to and eats meals independently in dining room 100% of time  Outcome: Progressing     Problem: Defensive Coping  Goal: Cooperates with admission process  Outcome: Progressing  Goal: Identifies reckless/dangerous behavior  Outcome: Progressing  Goal: Identifies stressors that lead to reckless/dangerous behavior  Outcome: Progressing  Goal: Discusses and identifies healthy coping skills  Outcome: Progressing  Goal: Demonstrates healthy coping skills  Outcome: Progressing  Goal: Identifies appropriate social interaction  Outcome: Progressing  Goal: Demonstrates appropriate social interactions  Outcome: Progressing  Goal: Patient/Family verbalizes awareness of resources  Outcome: Progressing  Goal: Discusses signs/symptoms of illness/treatment options  Outcome: Progressing  Goal: Patient/Family participate in treatment and discharge plans  Outcome: Progressing  Goal: Understands least restrictive measures  Outcome: Progressing  Goal: Free from restraint events  Outcome: Progressing

## 2024-11-26 NOTE — GROUP NOTE
"Group Topic: Dialectical Behavioral Therapy - Distress Tolerance   Group Date: 11/26/2024  Start Time: 1400  End Time: 1445  Facilitators: ROSALINDA Malave   Department: [facility] REHAB THERAPY VIRTUAL    Number of Participants: 5   Group Focus: acceptance, coping skills, personal responsibility, and self-awareness  Treatment Modality: Recreation Therapy  Interventions utilized were: DBT-DT-STOP and Turning the Mind Skills,  clarification, exploration, mental fitness, patient education, and support  Purpose: coping skills, maladaptive thinking, feelings, and insight or knowledge    Name: Taj Boyd YOB: 2005   MR: [id number]      Facilitator: Recreational Therapist  Level of Participation: moderate, arrived late  Quality of Participation: cooperative, passive, and quiet  Interactions with others:  minimal, hesitant, awkward  Mood/Affect: blunted, flat, and calm  Triggers (if applicable): N/A  Cognition: logical and capable  Progress: Moderate  Comments: This group discussed the S.T.O.P. acronym which is stop, take a step back, observe, and proceed mindfully. Participants were encouraged to share how they may use this skill and when. Group also involved distress tolerance skills \"turning the mind\" and understanding the step by step process to work at acceptance verse rejection. Steps include: observation, making an inner commitment, practice/doing it again, and developing a plan.      Mr. Boyd joined group shortly after it stared. When in attendance he was attentive and appeared to comprehend all of the information being discussed. Patient was soft spoken but when prompted and encouraged provided feedback/responses.     Plan: continue with services      "

## 2024-11-26 NOTE — NURSING NOTE
Pt pleasant and cooperative this shift. Attended groups throughout the day. Lexapro increased to 15mg at night. Q15 minute safety checks maintained.

## 2024-11-26 NOTE — PROGRESS NOTES
"Occupational Therapy     REHAB Therapy Assessment & Treatment    Patient Name: Taj Boyd  MRN: [id number]  Today's Date: 11/26/2024      Activity Assessment:     Self-Reflection and Personal Understanding Group: [id number]  Meaningful Sharing and Leisure task Group: [id number]  Daily Balance and Decision Making Group: [id number]    3/3 Groups attended     Following OT eval and in collaboration with the OTR/L, pt present in all above groups with G participation overall however, pt does present as socially guarded at times, often looking down at the table and keeping mostly to himself. When pt asked a question, pt able to respond insightfully 100% of the time. Pt shares understanding of the importance of self reflection as a motivational tool for improvement of quality of life as well as able to complete handout sharing aloud \"I am driven to have my voiced heard, I have the goal of making others smile, I want to be a , and I get disappointed by missed opportunities\" During daily balance, pt appears more comfortable participating with peers as well as able to share understanding of how balance in his personal/daily life can be important to improve self confidence/decision making. Pt with F progress toward OT goals as evidenced above. Pt would benefit from continued OT services in order to improve overall self-esteem, personal confidence and positive supports for safe transition at discharge.      Encounter Problems       Encounter Problems (Active)       OT Goals       Pt will explore and ID 1-2 strategies to manage stressors/symptoms of illness/ grief more effectively prior to discharge.        Start:  11/26/24    Expected End:  12/17/24            Pt will ID/ utilize 1-2 ways to increase balance of activity/ re-involve self in functional daily routines/roles prior to discharge.        Start:  11/26/24    Expected End:  12/17/24            Pt will ID 2-3 effective ways to distract from crisis and ID " stressors/ personal symptoms of stress in order to improve management of stress during daily activities.        Start:  11/26/24    Expected End:  12/17/24            Pt will improve ability to ID and express emotions while accepting and tolerating all emotions, in order to increase awareness of positive emotions.        Start:  11/26/24    Expected End:  12/17/24            Pt will ID 2 community resources/programs to join/attend after D/C to improve their support system       Start:  11/26/24    Expected End:  12/17/24                         Additional Comments:    THOMPSON collaborated with patients nurse and charge nurse throughout the day to provide appropriate support and encouragement to attend groups. Pt up on unit when THOMPSON left last group of the day. All needs met.

## 2024-11-26 NOTE — GROUP NOTE
Group Topic: Gross Motor/Balance Skills   Group Date: 11/26/2024  Start Time: 1600  End Time: 1640  Facilitators: KIRK MalvaeS   Department: [facility] REHAB THERAPY VIRTUAL    Number of Participants: 4   Group Focus: Physical Activity, leisure skills  Treatment Modality: Recreation Therapy  Interventions utilized were: Bocce Ball, Leisure Discussion, leisure development  Purpose: Leisure Awareness, Physical/Movement, Pleasurable Activity, Healthy Competition, coping skills    Name: Taj Boyd YOB: 2005   MR: [id number]      Facilitator: Recreational Therapist  Level of Participation: moderate, physically active  Quality of Participation: cooperative and quiet  Interactions with others:  none, soft spoken  Mood/Affect: blunted, flat, and lacking emotion  Triggers (if applicable): N/A  Cognition:  focused, capable  Progress: Moderate  Comments: This physical activity (Bocce Ball) involved coordinating movements, social interactions, healthy competition, leisure awareness, and a pleasurable experience.     Patient attended the entire session and completed most desired group tasks. He physically was able to complete all movement tasks independently. He completed all of his turns while standing. Patient did not engage verbally with the group participants.     Plan: continue with services

## 2024-11-26 NOTE — PROGRESS NOTES
"Occupational Therapy     REHAB Therapy Assessment & Treatment    Patient Name: Taj Boyd  MRN: [id number]  Today's Date: 11/26/2024      Time In: 0907 Time Out: 0916  Date of OT Referral: 11/26/24   Admission Diagnosis: MDD w/o psychotic features   Reason for Referral: psych  General Information   Past Medical History/History of Present Illness: Depression    Pain: 0/10   Precautions: suicide precautions   Appearance: Pt completing activity independently in common area upon arrival to evaluations   Initial Response to Eval: pt pleasant, cooperative, demonstrates willingness to participate in future OT interventions and group sessions   Current Stressors: decreased self esteem, school stress, confidence  Personal History   Marital Status: single   Community Support: Does not identify outpatient resources   Support System: has good social support from parents and friends   Living Situation: at home with parents   Home Set-Up: no concerns   DME Used and/or Owned: none   Prior Level of Function: independent   Level of Education: currently at [employer], reports poor academic performance secondary to poor mental health/self esteem   Employment Status: N/A   Leisure Interests: writing, video games  Psychosocial/Cognition Assessment   Orientation: AOx4   Attention: WFL   Follows Commands: WFL   Mood: pt reserved, appears depressed, reports frequent difficulties getting OOB   Safety Awareness: impaired as evidenced by SI   Patient Identified Life Roles: reports increased difficulty with school work, reports procrastination and mental health impacting grades   Patient Identified Goals-Short Term: \"try to improve my self worth and self esteem\"     Long Term: \"get a better outlook on life\"  Perceptual/Communication Skills   Speech (dysarthric, exp/rec aphasia, pressured, etc.): WFL   Vision: WFL   Perception (inattention, delusions, hallucinations, suicidal, etc): WFL   Reality Based Behavior: WFL   Perseverations: " WFL   Social Skills/Behavior: pt pleasant, reserved with speech. Asks/answers questions appropriately. Conversational, demonstrates willingness to participate in group activities   Basic Functional Mobility   Device Used: none   Bed Mobility: WFL   Transfer Status: WFL   Ambulatory Status: WFL   Balance: WFL   Endurance: WFL  Activities of Daily Living   Grooming/Hygiene: WFL   Dressing: [facility]   Bathing: [facility]   Toileting: [facility]   Sleep: pt reports poor sleep hygiene  Instrumental Activities of Daily Living   Medication Management/Compliance: states compliance with mental health medications but reports he thinks they are ineffective, demonstrates willingness to work with staff on new medication regime   Managing Finances: WFL   Patient Reported Daily Routine/Responsibility: Pt unable to identify components of daily routine, reports increased difficulty getting OOB and starting day at consistent/appropriate times  Emotional/Mental Health Management   Patient Identified Coping Skills- Positive: N/A      Negative: N/A   Knowledge of Illness/Emotions: pt demonstrates awareness of mental health issues, lacks insight into safety and how to manage   Stress Management: Maximally impaired, would benefit from exploration of stress management techniques and coping skills   Anger/Frustration Management: [facility]   Depression/Anxiety Management: Maximally impaired, would benefit from exploration of stress management techniques and coping skills   Relapse Prevention Skills/Supports: Pt would benefit from exploration of community resources to use for post-discharge carryover of learned skills.          Encounter Problems       Encounter Problems (Active)       OT Goals       Pt will explore and ID 1-2 strategies to manage stressors/symptoms of illness/ grief more effectively prior to discharge.        Start:  11/26/24    Expected End:  12/17/24            Pt will ID/ utilize 1-2 ways to increase balance of activity/ re-involve self in  functional daily routines/roles prior to discharge.        Start:  11/26/24    Expected End:  12/17/24            Pt will ID 2-3 effective ways to distract from crisis and ID stressors/ personal symptoms of stress in order to improve management of stress during daily activities.        Start:  11/26/24    Expected End:  12/17/24            Pt will improve ability to ID and express emotions while accepting and tolerating all emotions, in order to increase awareness of positive emotions.        Start:  11/26/24    Expected End:  12/17/24            Pt will ID 2 community resources/programs to join/attend after D/C to improve their support system       Start:  11/26/24    Expected End:  12/17/24                     Education Documentation  No documentation found.  Education Comments  No comments found.          Additional Comments:

## 2024-11-26 NOTE — NURSING NOTE
Pt took his night time medications  Pt stayed in his room and slept all night long  Pt had an uneventful night

## 2024-11-26 NOTE — CONSULTS
"Nutrition Initial Assessment:   Nutrition Assessment    Reason for Assessment: Provider consult order    Patient is a 19 y.o. male presenting with recurrent major depressive disorder & SI.     PMH: Depression    Nutrition History:  Energy Intake: Fair 50-75 %  Food and Nutrient History: Pt sitting at table having lunch at time of visit. Appears to be eating well. Pt amenable to RDN sitting & speaking with him. Pt reports he has had a diminished appetite over the past x1 year. Reports he typically consumes 3-4 small meals per day but that they've gotten progressively smaller over the last year. Reports he has used Boost shakes in the past, not recently. Is amenable to chocolate Ensure Plus HP w/ meals this admission. Denies any other nutritional concerns currently.  Vitamin/Herbal Supplement Use: Fusion fruit/vegetable gummy  Food Allergies/Intolerances:  None  GI Symptoms: None  Oral Problems: None     Anthropometrics:  Height: 177.8 cm (5' 10\")   Weight: 50.9 kg (112 lb 3.4 oz)   BMI (Calculated): 16.1  IBW/kg (Dietitian Calculated): 75.5 kg  Percent of IBW: 67 %     Weight History:   [11/25/24] 50.9 kg (Admit wt, standing scale)  [05/20/24] 52.2 kg  [07/19/23] 54.4 kg    Weight Change %:  Weight History / % Weight Change: 2% x 6 months  Significant Weight Loss: No    Nutrition Focused Physical Exam Findings:  Subcutaneous Fat Loss:   Orbital Fat Pads: Mild-Moderate (slight dark circles and slight hollowing)  Buccal Fat Pads: Mild-Moderate (flat cheeks, minimal bounce)  Muscle Wasting:  Temporalis: Mild-Moderate (slight depression)  Pectoralis (Clavicular Region): Mild-Moderate (some protrusion of clavicle)  Edema:  Edema: none  Physical Findings:  Hair: Negative  Eyes: Negative  Mouth: Negative  Nails: Negative  Skin: Negative (Intact)    Nutrition Significant Labs:  BMP Trend:   Results from last 7 days   Lab Units 11/25/24  0029   GLUCOSE mg/dL 113*   CALCIUM mg/dL 9.2   SODIUM mmol/L 138   POTASSIUM mmol/L 3.8 "   CO2 mmol/L 26   CHLORIDE mmol/L 105   BUN mg/dL 13   CREATININE mg/dL 0.93      Nutrition Specific Medications:  Scheduled medications  escitalopram, 15 mg, oral, Nightly    I/O:   Last BM Date: 11/25/24    Dietary Orders (From admission, onward)       Start     Ordered    11/25/24 1535  Diet Tray Safety tray  Until discontinued        Question:  Select tray type:  Answer:  Safety tray    11/25/24 1534    11/25/24 1226  Adult diet Regular  Diet effective now        Question:  Diet type  Answer:  Regular    11/25/24 1225             Estimated Needs:   Total Energy Estimated Needs (kCal):  (4129-2262)  Method for Estimating Needs: 35-40 kcals/kg x admit wt (50.9 kg)  Total Protein Estimated Needs (g):  (75+)  Method for Estimating Needs: ~1.5 g/kg x admit wt (50.9 kg)  Total Fluid Estimated Needs (mL):  (6709-7612)  Method for Estimating Needs: 1mL/kcal or per team        Nutrition Diagnosis   Malnutrition Diagnosis  Patient has Malnutrition Diagnosis: Yes  Diagnosis Status: New  Malnutrition Diagnosis: Moderate malnutrition related to chronic disease or condition  As Evidenced by: reported <75% EENs for >1 month, underweight BMI of 16.1, & mild-moderate muscle wasting/fat loss      Nutrition Interventions/Recommendations         Nutrition Prescription:  Continue liberalized regular diet as tolerated        Nutrition Interventions:   Interventions: Medical food supplement  Medical Food Supplement: Commercial beverage  Goal: Ensure Plus HP TID (provides 350 kcals/20g protein each)    Nutrition Monitoring and Evaluation   Food/Nutrient Related History Monitoring  Monitoring and Evaluation Plan: Energy intake  Energy Intake: Estimated energy intake  Criteria: Meet >75% EENs    Time Spent (min): 60 minutes

## 2024-11-26 NOTE — CARE PLAN
Problem: Sensory Perceptual Alteration as Evidenced by  Goal: Patient/Family participate in treatment and discharge plans  Outcome: Progressing     Problem: Sensory Perceptual Alteration as Evidenced by  Goal: Patient/Family verbalizes awareness of resources  Outcome: Progressing     Problem: Sensory Perceptual Alteration as Evidenced by  Goal: Participates in unit activities  Outcome: Progressing     Problem: Sensory Perceptual Alteration as Evidenced by  Goal: Discusses signs/symptoms of illness/treatment options  Outcome: Progressing     Problem: Sensory Perceptual Alteration as Evidenced by  Goal: Initiates reality-based interactions  Outcome: Progressing     Problem: Coordination of Community Resources Needed  Goal: Coordination of Services will be Obtained  Outcome: Progressing     Problem: Assistance Required for Daily Activities  Goal: Develop a Plan to Assist Patient with Activites of Daily Living  Outcome: Progressing     Problem: Assessment of Caregiver  Goal: Caregiver Demonstrates Personal Health and Wellbeing; as well as Ability to Safely and Effectively Care for Patient  Outcome: Progressing     Problem: Access to Care Issue  Goal: Assess and Address Access Barriers  Outcome: Progressing     Problem: Risk of Exacerbation, or Readmission to Hospital Related to Symptoms of Comorbidities  Goal: Knowledge and Symptom Management of Chronic Disease will be Documented  Outcome: Progressing     Problem: Risk of Uncoordinated Care  Goal: Care will be Coordinated and Supported by a Multidisciplinary Team of Providers  Outcome: Progressing     Problem: Negative Experience, Conflict with, or Distrust of Providers and/or Health System  Goal: Plan to Address Patient Specific Negative Experience, Distrust, or Conflict with Providers and/or Health System  Outcome: Progressing

## 2024-11-26 NOTE — NURSING NOTE
"Patient out on the unit and social with peers this shift. Pt pleasant and cooperative with care. Denied anxiety and depression. Denied SI/HI. Denied auditory/visual/other hallucinations. No complaints of pain or discomfort. Medication compliant. Able to state positive coping skills such as \"deep breathing\". Pt attended groups this shift. Pt showered today. Q15 minute checks to be maintained throughout shift for safety.   "

## 2024-11-26 NOTE — NURSING NOTE
"Pt interview in the patients room   Pt has just got her today  Pt stated his day was \"Pretty good\"   Pt rated his anxiety 0/10  depression 0/10  and denied everything else at this time  Pt stated his goal \" get sleep\"  his strength \"I like to write\"  and his coping skill  \" I deep breath\"  Pt is soft spoken and quiet  Pt is appropriate with his answers to the questions at this time   "

## 2024-11-26 NOTE — ASSESSMENT & PLAN NOTE
Plan: 1) Lexapro 10 -> 15 mg at bedtime (continue)           2) Group and milieu therapy (continue)    Discussed potential risks, benefits, and alternatives to medications with patient, who consented to the above medications.

## 2024-11-26 NOTE — PROGRESS NOTES
"Taj Boyd is a 19 y.o.  male patient who is on U admission day 1.      Subjective   Taj Boyd is a 19 y.o. year old male patient who was personally seen and interviewed, and discussed in morning team rounds. The patient was interviewed while sitting in a chair doing sudoku, and was easily engaged and cooperative. This morning,  he reports feeling \"ok\" and currently rates his depression at a 0 out of 10. No suicidal ideation or plans were elicited. He also rates his anxiety at a 0 out of 10. No hallucinations or paranoia were endorsed or noted. Taj slept on and off for 7-8 hours last night (broken). He attended group therapy this morning which he states went well. He discussed healthy coping mechanisms and how he needs to be less hard on himself. He received 10 mg Lexapro last night and had an egg and cheese sandwich this morning for breakfast.        Objective   Mental Status Exam:   General: Appropriately groomed and dressed in casual/hospital attire.   Appearance: Appears stated age.   Attitude: Calm, cooperative.   Behavior: Appropriate eye contact.   Motor Activity: No agitation or retardation. No EPS/TD. Normal gait and station. Decreased muscle tone and bulk.   Speech: Regular rate, rhythm, volume and tone, spontaneous, fluent. Non-pressured.   Mood: \"Feeling ok\"   Affect: Neutral.   Thought Process: Organized, linear, goal directed.   Thought Content: Does not endorse suicidal ideation or any suicidal plans.   Does not endorse homicidal ideation.  No overt delusions or paranoia elicited.    Thought Perception: Does not endorse auditory or visual hallucinations, does not appear to be responding to hallucinatory stimuli.   Cognition: Alert, oriented x 3. No deficits noted. Adequate fund of knowledge. No deficit in recent and remote memory. No deficits in attention, concentration or language.   Insight: Poor-to-fair, as patient recognizes some symptoms of illness and some need for recommended " treatments.    Judgment: Impaired, as patient can not make reasonable decisions about ordinary activities of daily living and necessary medical care recommendations. Prior suicidal thoughts and plan.           LABS:  Results for orders placed or performed during the hospital encounter of 11/25/24 (from the past 96 hours)   Glucose, Fasting   Result Value Ref Range    Glucose, Fasting 85 74 - 99 mg/dL   Lipid Panel   Result Value Ref Range    Cholesterol 133 0 - 199 mg/dL    HDL-Cholesterol 36.8 mg/dL    Cholesterol/HDL Ratio 3.6     LDL Calculated 87 <=109 mg/dL    VLDL 9 0 - 40 mg/dL    Triglycerides 47 0 - 114 mg/dL    Non HDL Cholesterol 96 0 - 119 mg/dL        Last Recorded Vitals  Visit Vitals  BP 90/51 (BP Location: Right arm, Patient Position: Sitting)   Pulse 56   Temp 36.6 °C (97.9 °F)   Resp 16        Intake/Output last 3 Shifts:  No intake/output data recorded.    Relevant Results  Scheduled medications  escitalopram, 10 mg, oral, Nightly      Continuous medications     PRN medications  PRN medications: alum-mag hydroxide-simeth, diphenhydrAMINE **OR** diphenhydrAMINE, haloperidol **OR** haloperidol lactate, hydrOXYzine pamoate, ibuprofen, LORazepam **OR** LORazepam, melatonin, psyllium               Assessment/Plan   Assessment & Plan  Severe episode of recurrent major depressive disorder, without psychotic features (Multi)  Plan: 1) Lexapro 10 -> 15 mg at bedtime (continue)           2) Group and milieu therapy (continue)    Discussed potential risks, benefits, and alternatives to medications with patient, who consented to the above medications.  Underweight  Plan: (1) Encourage oral intake      KAYLENE Torres

## 2024-11-26 NOTE — CARE PLAN
Problem: Risk for Suicide  Goal: Accepts medications as prescribed/needed this shift  Outcome: Progressing  Goal: Identifies supports this shift  Outcome: Progressing  Goal: Makes needs known through verbalization or behaviors this shift  Outcome: Progressing  Goal: No self harm this shift  Outcome: Progressing  Goal: Read Safety Guidelines this shift  Outcome: Progressing  Goal: Complete Mental Health Safety Plan (psychiatry only) this shift  Outcome: Progressing     Problem: Discharge Planning - Care Management  Goal: Discharge to post-acute care or home with appropriate resources  Outcome: Progressing     Problem: Community resource needs  Goal: Patient is receiving increased resource support to enhance ability to remain at home  Outcome: Progressing     Problem: Mental health issues  Goal: Stabilize adverse mental health factors affecting plan of care  Outcome: Progressing     Problem: Access to Care Issue  Goal: Assess and Address Access Barriers  Outcome: Progressing     Problem: Assessment of Caregiver  Goal: Caregiver Demonstrates Personal Health and Wellbeing; as well as Ability to Safely and Effectively Care for Patient  Outcome: Progressing     Problem: Assistance Required for Daily Activities  Goal: Develop a Plan to Assist Patient with Activites of Daily Living  Outcome: Progressing     Problem: Coordination of Community Resources Needed  Goal: Coordination of Services will be Obtained  Outcome: Progressing     Problem: Coordination of Psychosocial Support Services Needed  Goal: Coordination of Services will be Obtained  Outcome: Progressing     Problem: Medication Adherence  Goal: Adherence to Medication Regimen  Outcome: Progressing     Problem: Financial Problem  Goal: Assess and Address Specific Financial Obstacles Affecting Patient's Adderence to Plan of Care  Outcome: Progressing  Goal: STG - Patient will complete simple calculations for time/money management  Outcome: Progressing     Problem:  Risk of Exacerbation, or Readmission to Hospital Related to Symptoms of Comorbidities  Goal: Knowledge and Symptom Management of Chronic Disease will be Documented  Outcome: Progressing     Problem: Risk of Uncoordinated Care  Goal: Care will be Coordinated and Supported by a Multidisciplinary Team of Providers  Outcome: Progressing     Problem: Negative Experience, Conflict with, or Distrust of Providers and/or Health System  Goal: Plan to Address Patient Specific Negative Experience, Distrust, or Conflict with Providers and/or Health System  Outcome: Progressing

## 2024-11-26 NOTE — GROUP NOTE
"Group Topic: Goals   Group Date: 11/26/2024  Start Time: 0730  End Time: 0800  Facilitators: ROSALINDA Malave   Department: [facility] REHAB THERAPY VIRTUAL    Number of Participants: 3   Group Focus: check in, daily focus, and goals  Treatment Modality: Recreation Therapy   Interventions utilized were: A Goal-Setting Process, exploration, orientation, and support  Purpose: Goal Identification, self-care    Name: Taj Boyd YOB: 2005   MR: [id number]      Facilitator: Recreational Therapist  Level of Participation: moderate  Quality of Participation: quiet, withdrawn, and passive  Interactions with others:  soft spoken, minimal  Mood/Affect: closed / guarded and slightly anxious  Triggers (if applicable): N/A  Cognition: fearful and capable  Progress: Minimal  Comments: This session focused on a process to better visualize one's goal. Goal areas included concepts like: career, education, finances, physical, pleasure, attitude, giving back, and spiritual.  Patients were encouraged to establish short term (1 year or less) goals in whichever areas (listed) they wanted. Additionally, they had the option to also create  long term (5+ year) goals. Participants were asked to share some of the goals which they wish to focus on.       Patient attended the entire session. Mr. Boyd with encouragement identified two goal. He shared that he would like to improve his \"mindset\" and that he wishes to \"better manage his thoughts\". Patient stated that he often has \"racing thoughts when trying to \"rest/sleep\".     Plan: continue with services      "

## 2024-11-27 PROBLEM — G47.9 SLEEP DIFFICULTIES: Status: ACTIVE | Noted: 2024-11-27

## 2024-11-27 PROBLEM — R63.6 UNDERWEIGHT: Status: RESOLVED | Noted: 2024-11-25 | Resolved: 2024-11-27

## 2024-11-27 PROCEDURE — 1240000001 HC SEMI-PRIVATE BH ROOM DAILY

## 2024-11-27 PROCEDURE — 99233 SBSQ HOSP IP/OBS HIGH 50: CPT | Performed by: PSYCHIATRY & NEUROLOGY

## 2024-11-27 PROCEDURE — 2500000001 HC RX 250 WO HCPCS SELF ADMINISTERED DRUGS (ALT 637 FOR MEDICARE OP): Performed by: PSYCHIATRY & NEUROLOGY

## 2024-11-27 PROCEDURE — 97150 GROUP THERAPEUTIC PROCEDURES: CPT | Mod: GO,CO

## 2024-11-27 RX ORDER — ESCITALOPRAM OXALATE 20 MG/1
20 TABLET ORAL NIGHTLY
Status: DISCONTINUED | OUTPATIENT
Start: 2024-11-27 | End: 2024-12-03 | Stop reason: HOSPADM

## 2024-11-27 RX ORDER — CHOLECALCIFEROL (VITAMIN D3) 25 MCG
1000 TABLET ORAL DAILY
Status: DISCONTINUED | OUTPATIENT
Start: 2024-11-27 | End: 2024-12-03 | Stop reason: HOSPADM

## 2024-11-27 RX ADMIN — Medication 5 MG: at 20:18

## 2024-11-27 RX ADMIN — ESCITALOPRAM OXALATE 20 MG: 20 TABLET ORAL at 20:18

## 2024-11-27 RX ADMIN — Medication 1000 UNITS: at 14:51

## 2024-11-27 ASSESSMENT — COLUMBIA-SUICIDE SEVERITY RATING SCALE - C-SSRS
6. HAVE YOU EVER DONE ANYTHING, STARTED TO DO ANYTHING, OR PREPARED TO DO ANYTHING TO END YOUR LIFE?: NO
1. SINCE LAST CONTACT, HAVE YOU WISHED YOU WERE DEAD OR WISHED YOU COULD GO TO SLEEP AND NOT WAKE UP?: NO
2. HAVE YOU ACTUALLY HAD ANY THOUGHTS OF KILLING YOURSELF?: NO
2. HAVE YOU ACTUALLY HAD ANY THOUGHTS OF KILLING YOURSELF?: NO
1. SINCE LAST CONTACT, HAVE YOU WISHED YOU WERE DEAD OR WISHED YOU COULD GO TO SLEEP AND NOT WAKE UP?: NO
6. HAVE YOU EVER DONE ANYTHING, STARTED TO DO ANYTHING, OR PREPARED TO DO ANYTHING TO END YOUR LIFE?: NO

## 2024-11-27 ASSESSMENT — PAIN SCALES - GENERAL
PAINLEVEL_OUTOF10: 0 - NO PAIN
PAINLEVEL_OUTOF10: 0 - NO PAIN

## 2024-11-27 ASSESSMENT — PAIN - FUNCTIONAL ASSESSMENT
PAIN_FUNCTIONAL_ASSESSMENT: 0-10
PAIN_FUNCTIONAL_ASSESSMENT: 0-10

## 2024-11-27 NOTE — NURSING NOTE
"Patient was assessed this morning in the hallway away from peers for privacy. He was quiet, cooperative and soft spoken. He denies anxiety or depression with no SI/HI or AVH reported. Patients goal for today was \" work on self worth\". He states he is \" feeling better and realizes there are people who care about him\". He has been both group and medication compliant. His coping skills are deep breathing, calm music\" Will continue to monitor for safety and encourage group participation.  "

## 2024-11-27 NOTE — CARE PLAN
"The patient's goals for the shift include \" work on my self worth\"    The clinical goals for the shift include maintain safety    Over the shift, the patient made progress toward the following goals of maintaining safety. Barriers to progression include depression and anxiety. Recommendations to address these barriers include continue medications as ordered.    "

## 2024-11-27 NOTE — CARE PLAN
"The patient's goals for the shift include \"grt some good sleep\"    The clinical goals for the shift include maintain safety    Over the shift, the patient did make progress toward the following goals.   Problem: Fall/Injury  Goal: Not fall by end of shift  Outcome: Progressing  Goal: Be free from injury by end of the shift  Outcome: Progressing  Goal: Verbalize understanding of personal risk factors for fall in the hospital  Outcome: Progressing  Goal: Verbalize understanding of risk factor reduction measures to prevent injury from fall in the home  Outcome: Progressing  Goal: Use assistive devices by end of the shift  Outcome: Progressing  Goal: Pace activities to prevent fatigue by end of the shift  Outcome: Progressing     Problem: Sensory Perceptual Alteration as Evidenced by  Goal: Cooperates with admission process  Outcome: Progressing  Goal: Patient/Family participate in treatment and discharge plans  Outcome: Progressing  Goal: Patient/Family verbalizes awareness of resources  Outcome: Progressing  Goal: Participates in unit activities  Outcome: Progressing  Goal: Discusses signs/symptoms of illness/treatment options  Outcome: Progressing  Goal: Initiates reality-based interactions  Outcome: Progressing  Goal: Able to discuss content of hallucinations/delusions  Outcome: Progressing  Goal: Notifies staff when experiencing hallucinations/delusions  Outcome: Progressing  Goal: Verbalizes reduction in hallucinations/delusions  Outcome: Progressing  Goal: Will not act on psychotic perception  Outcome: Progressing  Goal: Understands least restrictive measures  Outcome: Progressing  Goal: Free from restraint events  Outcome: Progressing     Problem: Altered Thought Processes as Evidenced by  Goal: STG - Desires improvement in ability to think and concentrate  Outcome: Progressing  Goal: STG - Participates in Occupational Therapy and other cognitive assessments  Outcome: Progressing     Problem: Potential for Harm " to Self or Others  Goal: Cooperates with admission process  Outcome: Progressing  Goal: Participates in unit activities  Outcome: Progressing  Goal: Patient/Family participate in treatment and discharge plans  Outcome: Progressing  Goal: Identifies deescalation techniques  Outcome: Progressing  Goal: Understands least restrictive measures  Outcome: Progressing  Goal: Identifies stressors that lead to harmful behaviors  Outcome: Progressing  Goal: Notifies staff when experiencing harmful thoughts toward self/others  Outcome: Progressing  Goal: Denies harm toward self or others  Outcome: Progressing  Goal: Free from restraint events  Outcome: Progressing     Problem: Educational/Scholastic Disruption  Goal: Meets educational requirements during hospitalization  Outcome: Progressing  Goal: Attends class without disruptive behavior  Outcome: Progressing  Goal: Completes daily assignments  Outcome: Progressing     Problem: Ineffective Coping  Goal: Cooperates with admission process  Outcome: Progressing  Goal: Identifies ineffective coping skills  Outcome: Progressing  Goal: Identifies healthy coping skills  Outcome: Progressing  Goal: Demonstrates healthy coping skills  Outcome: Progressing  Goal: Participates in unit activities  Outcome: Progressing  Goal: Patient/Family participate in treatment and discharge plans  Outcome: Progressing  Goal: Patient/Family verbalizes awareness of resources  Outcome: Progressing  Goal: Understands least restrictive measures  Outcome: Progressing  Goal: Free from restraint events  Outcome: Progressing     Problem: Alteration in Sleep  Goal: STG - Reports nightly sleep, duration, and quality  Outcome: Progressing  Goal: STG - Identifies sleep hygiene aids  Outcome: Progressing  Goal: STG - Informs staff if unable to sleep  Outcome: Progressing  Goal: STG - Attends breathing and relaxation group  Outcome: Progressing     Problem: Potential for Substance Withdrawal  Goal: Verbalizes  signs/symptoms of withdrawal  Outcome: Progressing  Goal: Reports signs/symptoms of withdrawal  Outcome: Progressing  Goal: Free of withdrawal symptoms  Outcome: Progressing     Problem: Anxiety  Goal: Patient/family understands admission protocols  Outcome: Progressing  Goal: Attempts to manage anxiety with help  Outcome: Progressing  Goal: Verbalizes ways to manage anxiety  Outcome: Progressing  Goal: Implements measures to reduce anxiety  Outcome: Progressing  Goal: Free from restraint events  Outcome: Progressing     Problem: Self Care Deficit  Goal: STG - Patient completes hygiene  Outcome: Progressing  Goal: Increase group attendance  Outcome: Progressing  Goal: Accepts need for medications  Outcome: Progressing  Goal: STG - Goes to and eats meals independently in dining room 100% of time  Outcome: Progressing     Problem: Defensive Coping  Goal: Cooperates with admission process  Outcome: Progressing  Goal: Identifies reckless/dangerous behavior  Outcome: Progressing  Goal: Identifies stressors that lead to reckless/dangerous behavior  Outcome: Progressing  Goal: Discusses and identifies healthy coping skills  Outcome: Progressing  Goal: Demonstrates healthy coping skills  Outcome: Progressing  Goal: Identifies appropriate social interaction  Outcome: Progressing  Goal: Demonstrates appropriate social interactions  Outcome: Progressing  Goal: Patient/Family verbalizes awareness of resources  Outcome: Progressing  Goal: Discusses signs/symptoms of illness/treatment options  Outcome: Progressing  Goal: Patient/Family participate in treatment and discharge plans  Outcome: Progressing  Goal: Understands least restrictive measures  Outcome: Progressing  Goal: Free from restraint events  Outcome: Progressing

## 2024-11-27 NOTE — PROGRESS NOTES
"Occupational Therapy     REHAB Therapy Assessment & Treatment    Patient Name: Taj Boyd  MRN: [id number]  Today's Date: 11/27/2024      Activity Assessment:     Happiness and Self-Motivating Behaviors Group: [id number]  Being In Control and Positive Changes Group: [id number]  Cognitive Task and Social Interaction Group: [id number]    3/3 Groups attended     Pt present with G participation in all above groups with continued cooperative, help seeking behaviors. Pt with G understanding of happiness education however, when pt fills out his happiness survey, he shares \"getting high scores in all area's, I didn't have any negative scores\" Pt goes onto demo G understanding in changes group and is able to ID \"being in control of myself, my emotions, and how I react. I am not in control of events, other peoples emotions, and their reactions\" in order to improve self awareness/confidence in decision making. During cog task group, pt with much improved affect during and following group, shares enjoyment of the activity, as well as takes G leadership role >75% of the time. Pt with much improved social interaction with select peers and continues to make F progress toward OT goals as evidenced above. Pt would benefit from continued OT services in order to improve overall self-esteem, personal confidence and positive supports for safe transition at discharge.         Encounter Problems       Encounter Problems (Active)       OT Goals       Pt will explore and ID 1-2 strategies to manage stressors/symptoms of illness/ grief more effectively prior to discharge.  (Progressing)       Start:  11/26/24    Expected End:  12/17/24            Pt will ID/ utilize 1-2 ways to increase balance of activity/ re-involve self in functional daily routines/roles prior to discharge.  (Progressing)       Start:  11/26/24    Expected End:  12/17/24            Pt will ID 2-3 effective ways to distract from crisis and ID stressors/ personal symptoms of " stress in order to improve management of stress during daily activities.  (Progressing)       Start:  11/26/24    Expected End:  12/17/24            Pt will improve ability to ID and express emotions while accepting and tolerating all emotions, in order to increase awareness of positive emotions.  (Progressing)       Start:  11/26/24    Expected End:  12/17/24            Pt will ID 2 community resources/programs to join/attend after D/C to improve their support system (Progressing)       Start:  11/26/24    Expected End:  12/17/24                   Additional Comments:    THOMPSON collaborated with patients nurse and charge nurse throughout the day to provide appropriate support and encouragement to attend groups. Pt up on unit when THOMPSON left last group of the day. All needs met.

## 2024-11-27 NOTE — NURSING NOTE
"Pt resting in bed at start of shift. A&Ox4 with VSS. Calm and cooperative during nursing assessment. Denies anxiety, depression, SI/HI, VAT hallucinations and physical pain. Last BM 11/26. Pt stated she had attended and participated in groups for the day. Coping skill was \"S.T.O.P\" and shift goal was to \" get a good nights rest\". Lexapro increased to 15mg at HS. Pt is medication compliant. Showered earlier in day and dressed in hospital attire. Pt asleep by 2100 and slept through the night with no visible disturbances.   "

## 2024-11-27 NOTE — GROUP NOTE
"Group Topic: Chemical Dependency - Dual Diagnosis   Group Date: 11/27/2024  Start Time: 1400  End Time: 1500  Facilitators: ROSALINDA Malave   Department: [facility] REHAB THERAPY VIRTUAL    Number of Participants: 7   Group Focus: chemical dependency education, dual diagnosis, and personal responsibility  Treatment Modality: Recreation Therapy  Interventions utilized were: Name that Stage (stages of recovery) and additional recovery areas, exploration, mental fitness, patient education, and support  Purpose: Dual Education, insight or knowledge    Name: aTj Boyd YOB: 2005   MR: [id number]      Facilitator: Recreational Therapist  Level of Participation: active  Quality of Participation: attentive, cooperative, and engaged  Interactions with others:  soft spoken and appropriate  Mood/Affect: appropriate and brightens with interaction  Triggers (if applicable): N/A  Cognition:  capable  Progress: Moderate  Comments: This session involved education on the stages of recovery related to mental health and addiction.  Patients were asked to problem solve examples into the appropriate stage (5 stages: before bottom, bottom, on the fence, commitment, and life goes on).  Patients were also provided a resource of \"other areas of recovery\" that may be of assistance during a recovery process.    Patient attended the entire session and completed all desired group tasks. He was able to understand the stages and provide accurate answers with examples.     Plan: continue with services      "

## 2024-11-27 NOTE — GROUP NOTE
"Group Topic: Self Esteem   Group Date: 11/27/2024  Start Time: 0730  End Time: 0810  Facilitators: ROSALINDA Malave   Department: [facility] REHAB THERAPY VIRTUAL    Number of Participants: 4   Group Focus: check in, goals, and self-esteem  Treatment Modality: Recreation Therapy  Interventions utilized were: 8 Steps to Improving Your Self-Esteem,  exploration, orientation, and support  Purpose: coping skills and self-worth    Name: Taj Boyd YOB: 2005   MR: [id number]      Facilitator: Recreational Therapist  Level of Participation: active  Quality of Participation: appropriate/pleasant, cooperative, engaged, and quiet  Interactions with others:  soft spoken and appropriate  Mood/Affect: appropriate and brightens with interaction  Triggers (if applicable): N/A  Cognition: coherent/clear and capable  Progress: Moderate  Comments: We discussed some ways to improve one’s self esteem by looking at eight strategies. Patients were asked to provide examples and problem solve through the instructions (handout). Participants were asked to focus on one or two of the provided skills and establish a goal related to it.    Mr. Boyd attended the entire. He shared his thoughts when prompted. Patient identified that he would like to work on \"channeling his inner \" today when discussing self-esteem tips to create goals around.     Plan: continue with services      "

## 2024-11-27 NOTE — ASSESSMENT & PLAN NOTE
Plan: 1) Lexapro 15 -> 20 mg at bedtime (continue)           2) Group and milieu therapy (continue)    Discussed potential risks, benefits, and alternatives to medications with patient, who consented to the above medications.

## 2024-11-27 NOTE — PROGRESS NOTES
"Taj Boyd is a 19 y.o. year old male patient who is on U admission day 2.      Subjective   Taj Boyd is a 19 y.o. year old male patient who was personally seen and interviewed, and discussed in morning team rounds. The patient was interviewed alone in gallegos while sitting in a chair, and was easily engaged and cooperative. This morning,  Taj reports feeling \"better\" and currently rates his depression at a 0 out of 10. No suicidal ideations, plans, or homicidal ideations were elicited. He rates his anxiety at a 0 out of 10. No hallucinations or paranoia were endorsed or noted.    Patient slept about 6 hours last night (broken). He states he has trouble falling asleep unless he is very tired. He also states that he frequently wakes up in the middle of the night and is unable to get back to sleep due to over thinking and is unable to \"shut off\" his brain. We discussed possibly starting melatonin or trazodone to help.    He attended group therapy this morning which he states are going well. He enjoys the sessions that promote positive self-talk and self-worth. He understands that people care about him and want him to get better. He had a great breakfast of pancakes, cruz, and toast which he enjoyed.    He is tolerating the increased dose of Lexapro well, as he denies any nausea, vomiting, GI upset, or other adverse side effects.         Objective   Mental Status Exam:   General: Appropriately groomed and dressed in casual/hospital attire.   Appearance: Appears stated age.   Attitude: Calm, cooperative.   Behavior: Appropriate eye contact.   Motor Activity: No agitation or retardation. No EPS/TD. Normal gait and station. Normal muscle tone and bulk.   Speech: Regular rate, rhythm, volume and tone, spontaneous, fluent. Non-pressured.  Mood:  Euthymic . \"better\".   Affect: Neutral. Congruent with mood and topic of conversation.   Thought Process: Organized, linear, goal directed.   Thought Content: does not " endorse suicidal ideation or any suicide plans.   does not endorse homicidal ideation.  No overt delusions or paranoia elicited.    Thought Perception: does not endorse auditory or visual hallucinations, does not appear to be responding to hallucinatory stimuli. No perceptual abnormalities noted   Cognition: Alert, oriented x 3. No deficits noted. Adequate fund of knowledge. No deficit in recent and remote memory. No deficits in attention, concentration or language.   Insight: fair-to-good, as patient recognizes symptoms of  illness and need for recommended treatments.   Judgment: intact, as patient can make reasonable decisions about ordinary activities of daily living and necessary medical care recommendations.          LABS:  Results for orders placed or performed during the hospital encounter of 11/25/24 (from the past 96 hours)   Glucose, Fasting   Result Value Ref Range    Glucose, Fasting 85 74 - 99 mg/dL   Lipid Panel   Result Value Ref Range    Cholesterol 133 0 - 199 mg/dL    HDL-Cholesterol 36.8 mg/dL    Cholesterol/HDL Ratio 3.6     LDL Calculated 87 <=109 mg/dL    VLDL 9 0 - 40 mg/dL    Triglycerides 47 0 - 114 mg/dL    Non HDL Cholesterol 96 0 - 119 mg/dL   Vitamin D 25-Hydroxy,Total (for eval of Vitamin D levels)   Result Value Ref Range    Vitamin D, 25-Hydroxy, Total 26 (L) 30 - 100 ng/mL        Last Recorded Vitals  Visit Vitals  BP 99/70 (BP Location: Right arm, Patient Position: Standing)   Pulse 90   Temp 36.9 °C (98.4 °F)   Resp 16        Intake/Output last 3 Shifts:  No intake/output data recorded.    Relevant Results  Scheduled medications  escitalopram, 15 mg, oral, Nightly      Continuous medications     PRN medications  PRN medications: alum-mag hydroxide-simeth, diphenhydrAMINE **OR** diphenhydrAMINE, haloperidol **OR** haloperidol lactate, hydrOXYzine pamoate, ibuprofen, LORazepam **OR** LORazepam, melatonin, psyllium         Assessment/Plan   Assessment & Plan  Severe episode of  recurrent major depressive disorder, without psychotic features (Multi)  Plan: 1) Lexapro 15 -> 20 mg at bedtime (continue)           2) Group and milieu therapy (continue)    Discussed potential risks, benefits, and alternatives to medications with patient, who consented to the above medications.  Vitamin D insufficiency  Plan: 1) vitamin d supplementation  Sleep difficulties  Plan: 1) melatonin 5 mg prn           2) will follow      KAYLENE Torres

## 2024-11-27 NOTE — GROUP NOTE
Group Topic: Gross Motor/Balance Skills   Group Date: 11/27/2024  Start Time: 1600  End Time: 1650  Facilitators: KIRK MalaveS   Department: [facility] REHAB THERAPY VIRTUAL    Number of Participants: 6   Group Focus: Physical/Movement, leisure skills  Treatment Modality: Recreation Therapy  Interventions utilized were: Nintendo Wii, Music, leisure development  Purpose: Leisure Awareness, Physical Activity, Social Stimulation, Pleasurable Activity, coping skills    Name: Taj Boyd YOB: 2005   MR: [id number]      Facilitator: Recreational Therapist  Level of Participation: active  Quality of Participation: appropriate/pleasant, attentive, cooperative, and engaged  Interactions with others:  quiet, minimal and appropriate  Mood/Affect: brightens with interaction and positive  Triggers (if applicable): N/A  Cognition:  capable  Progress: Moderate  Comments: This session involved participating in multiple movement games via the [employer]. Patient participants were encouraged to practice coordinating motor movements, socialize with peers, and increase leisure awareness. For those participants that don't utilize technology for leisure they will be assisted with cognitive and movement tasks.      Mr. Boyd completed all movement tasks independently. He was quiet during most of the activity needing encouragement to socially interact. He appeared to enjoy the game and the company of his peers.     Plan: continue with services

## 2024-11-28 PROBLEM — F34.1 DYSTHYMIC DISORDER: Chronic | Status: ACTIVE | Noted: 2024-11-28

## 2024-11-28 LAB
ATRIAL RATE: 48 BPM
P AXIS: 53 DEGREES
PR INTERVAL: 148 MS
Q ONSET: 249 MS
QRS COUNT: 8 BEATS
QRS DURATION: 97 MS
QT INTERVAL: 401 MS
QTC CALCULATION(BAZETT): 362 MS
QTC FREDERICIA: 374 MS
R AXIS: 93 DEGREES
T AXIS: 37 DEGREES
T OFFSET: 450 MS
VENTRICULAR RATE: 49 BPM

## 2024-11-28 PROCEDURE — 99233 SBSQ HOSP IP/OBS HIGH 50: CPT | Performed by: PSYCHIATRY & NEUROLOGY

## 2024-11-28 PROCEDURE — 2500000002 HC RX 250 W HCPCS SELF ADMINISTERED DRUGS (ALT 637 FOR MEDICARE OP, ALT 636 FOR OP/ED): Performed by: PSYCHIATRY & NEUROLOGY

## 2024-11-28 PROCEDURE — 1240000001 HC SEMI-PRIVATE BH ROOM DAILY

## 2024-11-28 PROCEDURE — 2500000001 HC RX 250 WO HCPCS SELF ADMINISTERED DRUGS (ALT 637 FOR MEDICARE OP): Performed by: PSYCHIATRY & NEUROLOGY

## 2024-11-28 RX ORDER — HYDROXYZINE PAMOATE 50 MG/1
50 CAPSULE ORAL NIGHTLY
Status: DISCONTINUED | OUTPATIENT
Start: 2024-11-28 | End: 2024-12-02

## 2024-11-28 RX ADMIN — ESCITALOPRAM OXALATE 20 MG: 20 TABLET ORAL at 20:33

## 2024-11-28 RX ADMIN — Medication 1000 UNITS: at 09:16

## 2024-11-28 RX ADMIN — HYDROXYZINE PAMOATE 50 MG: 50 CAPSULE ORAL at 20:33

## 2024-11-28 ASSESSMENT — PAIN SCALES - GENERAL
PAINLEVEL_OUTOF10: 0 - NO PAIN
PAINLEVEL_OUTOF10: 0 - NO PAIN

## 2024-11-28 ASSESSMENT — COLUMBIA-SUICIDE SEVERITY RATING SCALE - C-SSRS
6. HAVE YOU EVER DONE ANYTHING, STARTED TO DO ANYTHING, OR PREPARED TO DO ANYTHING TO END YOUR LIFE?: NO
2. HAVE YOU ACTUALLY HAD ANY THOUGHTS OF KILLING YOURSELF?: NO
2. HAVE YOU ACTUALLY HAD ANY THOUGHTS OF KILLING YOURSELF?: NO
1. SINCE LAST CONTACT, HAVE YOU WISHED YOU WERE DEAD OR WISHED YOU COULD GO TO SLEEP AND NOT WAKE UP?: NO
6. HAVE YOU EVER DONE ANYTHING, STARTED TO DO ANYTHING, OR PREPARED TO DO ANYTHING TO END YOUR LIFE?: NO
1. SINCE LAST CONTACT, HAVE YOU WISHED YOU WERE DEAD OR WISHED YOU COULD GO TO SLEEP AND NOT WAKE UP?: NO

## 2024-11-28 ASSESSMENT — PAIN - FUNCTIONAL ASSESSMENT
PAIN_FUNCTIONAL_ASSESSMENT: 0-10
PAIN_FUNCTIONAL_ASSESSMENT: 0-10

## 2024-11-28 NOTE — GROUP NOTE
"Group Topic: Social Skills   Group Date: 11/28/2024  Start Time: 0930  End Time: 1030  Facilitators: KIRK SaavedraS   Department: [facility] REHAB THERAPY VIRTUAL    Number of Participants: 5   Group Focus: coping skills, goals, leisure skills, and social skills  Treatment Modality: Recreational Therapy   Interventions utilized were Social Jenga, exploration, leisure development, story telling, and support  Purpose: coping skills and other: social engagement, leisure awareness     Name: Taj Boyd YOB: 2005   MR: [id number]      Facilitator: Recreational Therapist  Level of Participation: active  Quality of Participation: appropriate/pleasant, cooperative, and engaged  Interactions with others: appropriate and gave feedback  Mood/Affect: appropriate, brightens with interaction, and positive  Triggers (if applicable): n/a  Cognition: coherent/clear and goal directed  Progress: Moderate  Comments: Patients were gathered to learn and participate in a game of \"Social Jenga\". As patients pulled blocks, they were prompted with ice breakers, brain teasers, coping skill and goal related questions. This activity works on cognitive skills, following directions, positive social interaction/teamwork, and promotes leisure awareness.    Pt was cooperative, pleasant, and engaged this morning. He appeared to enjoy time with peers and answered a variety of questions, identifying that he uses music, deep breathing, and writing as coping skills.     Plan: continue with services      "

## 2024-11-28 NOTE — NURSING NOTE
"Patient was assessed this morning in his room for privacy. He presents as pleasant and cooperative. Patient  denies anxiety or depression with no SI/HI or AVH reported. His goal for today was \" give thanks to my family\". He identified strengths as \" I'm a good writer and good at [facility] [employer] [employer]\". Patient  has been observed out on the unit and attending all groups. He tells this nurse that he went to sleep last night and woke up in the middle of the night. He states he had trouble falling back to sleep but is not sure if it's because he went to bed early. Dr. Unger ordered Visteral 50mg @ hs. Will continue to monitor for safety and continue to work on his sleep cycle.  "

## 2024-11-28 NOTE — GROUP NOTE
Group Topic: Art Creative   Group Date: 11/28/2024  Start Time: 1400  End Time: 1530  Facilitators: KIRK SaavedraS   Department: [facility] REHAB THERAPY VIRTUAL    Number of Participants: 8   Group Focus: leisure skills and social skills  Treatment Modality: Recreational Therapy   Interventions utilized were Bulzi Bucket, Open Art (Thanksgiving Sand Art/Acrylic Painting/RT Cart, group exercise and leisure development  Purpose: other: creative expression, leisure awareness, social engagement     Name: Taj Boyd YOB: 2005   MR: [id number]      Facilitator: Recreational Therapist  Level of Participation: moderate  Quality of Participation: appropriate/pleasant, cooperative, and quiet  Interactions with others: appropriate  Mood/Affect: appropriate and brightens with interaction  Triggers (if applicable): n/a  Cognition: coherent/clear and goal directed  Progress: Moderate  Comments: Patients were offered a variety of activities including a physical activity (Bulzi Bucket) and creative activities including Thanksgiving themed sand art projects and acrylic painting. This activity works on motor skills/coordinating movements, healthy competition, social interaction, creative expression, and provides an overall pleasurable experience.     Pt was engaged in physical activity with encouragement and appeared to enjoy the friendly competition. Following the game, he sat quietly working on a sodoku puzzle for remainder of session.     Plan: continue with services

## 2024-11-28 NOTE — CARE PLAN
"The patient's goals for the shift include \"get sleep\"    The clinical goals for the shift include medicaiton compliance    Over the shift, the patient did not make progress toward the following goals. Barriers to progression include lack of medication knowledge. Recommendations to address these barriers include more education on medications.    "

## 2024-11-28 NOTE — CARE PLAN
"The patient's goals for the shift include \" give thanks to my family\"    The clinical goals for the shift include medication compliance    Over the shift, the patient made  toward the following goals of medication compliance. Barriers to progression include acute anxiety. Recommendations to address these barriers include continue medications as ordered.    "

## 2024-11-28 NOTE — ASSESSMENT & PLAN NOTE
Plan: 1) Lexapro 15 -> 20 mg at bedtime (continue)           2) Group and milieu therapy (continue)    Discussed potential risks, benefits, and alternatives to medications with patient, who consented to the above medications.   Pt rang call bell for bed pan  Assisted onto bedpan at this time        [address] [place] [address], [address] [place] [address]  05/18/21 0601

## 2024-11-28 NOTE — GROUP NOTE
"Group Topic: Leisure Skills   Group Date: 11/28/2024  Start Time: 1115  End Time: 1200  Facilitators: KIRK SaavedraS   Department: [facility] REHAB THERAPY VIRTUAL    Number of Participants: 6   Group Focus: leisure skills and social skills  Treatment Modality: Recreational Therapy  Interventions utilized were Eye Know Trivia, exploration and leisure development  Purpose: other: cognitive skills , social engagement, leisure awareness     Name: Taj Boyd YOB: 2005   MR: [id number]      Facilitator: Recreational Therapist  Level of Participation: active  Quality of Participation: appropriate/pleasant, cooperative, and engaged  Interactions with others: appropriate and gave feedback  Mood/Affect: appropriate, brightens with interaction, and positive  Triggers (if applicable): n/a  Cognition: coherent/clear and goal directed  Progress: Moderate  Comments: Patients were gathered to learn and participate in the game \"Eye Know Trivia\". This activity works on cognitive skills, following directions, positive social interaction/teamwork, and promotes leisure awareness.     Pt was pleasant, cooperative, and appropriately engaged in group task.     Plan: continue with services      "

## 2024-11-28 NOTE — NURSING NOTE
"Pt interview in the patients room Pt is laying down and just wants to get to sleep  Pt stated his day was \"pretty good\"  Pt rated his anxiety 0/10  depression 0/10 and denied everything else at this time  Pt stated his goal \" get sleep\"  His coping skill \" I deep breath\"  his strength \" I like to write \"  and his goal  \" get sleep\"  Pt is appropriate with his answers to the questions at this time  "

## 2024-11-28 NOTE — NURSING NOTE
Pt  took his night time medications   Pt did not come out for snack  Pt stated he was not hungry  Pt slept all night long  Pt had an uneventful night

## 2024-11-28 NOTE — PROGRESS NOTES
"Taj Boyd is a 19 y.o. male on day 3 of admission presenting with Severe episode of recurrent major depressive disorder, without psychotic features (Multi).      Subjective   The patient was seen and examined. I reviewed the chart and vital signs from overnight. I reviewed previous notes. I reviewed medications, administered overnight and their reported benefits or side effects.  Patient reported he has had an \"nihilistic view of his world since high school which got worse in college\". Stated he has friends but they dont see each other often, talk on discord. States he is in several social groups?yet feels lonely and negative about the world. Has never been in therapy. States he is feeling better \"maybe'. I asked if he thought it may be the groups. Said maybe. Reported racing mind at night. States he has some difficulty focusing with work, procrastinates since elementary school, does not feel world is a positive place, in general. Anxious thoughts with negative thinking we identified and talked about. Mac said having someone to talk about this with and addressing his neg thoughts has been effective, in group. States he does not have good organizational skills.       Objective     Last Recorded Vitals  Blood pressure 95/65, pulse 57, temperature 36.7 °C (98.1 °F), temperature source Temporal, resp. rate 16, height 1.778 m (5' 10\"), weight 50.9 kg (112 lb 3.4 oz), SpO2 96%.    Review of Systems    Psychiatric ROS - Adult  Anxiety: reports anxiety during interview session, racing mind  Depression: negative rated as zero, negative view of the world reports life has no meaning Si on admission, denying?? Noted not to be connected to the depressive experience, patient sees this as part of his \"nihilistic views, rather than depression\"  Delirium: negative  Psychosis: negative  Bessie: negative  Safety Issues: chronic negative thinking, consideration of dysthymia    Scheduled medications  cholecalciferol, 1,000 Units, " oral, Daily  escitalopram, 20 mg, oral, Nightly  hydrOXYzine pamoate, 50 mg, oral, Nightly    Continuous medications     PRN medications  PRN medications: alum-mag hydroxide-simeth, diphenhydrAMINE **OR** diphenhydrAMINE, haloperidol **OR** haloperidol lactate, hydrOXYzine pamoate, ibuprofen, LORazepam **OR** LORazepam, melatonin, psyllium   Physical Exam      Mental Status Exam  General: CM with chronic neg views of his world, medication non responsive depression, not in therapy  Appearance: thin male, young, younger than stated age, good grooming and hygiene  Attitude: help seeking, open to talking, learning  Behavior: calm, nervous eye contacy  Motor Activity: slow rate of movement, no eps or td, gait normal  Speech: slow, low, low normal tone, volume, needs encouraging, somewhat spontaneous  Mood: negative views, dysthymic  Affect: anxious in appearance  Thought Process: linear, goal directed, organized  Thought Content: SI in er, denied today, denied HI, no overt delusions elicited  Thought Perception: denied AH, denied VH, no somatic, tactile, olfactory hallucinations  Cognition: alert and oriented x5, grossly intact short and long term memory  Insight: needs assistance, intensive cbt therapy, insight into thoughts  Judgement: help seeking    Assessment/Plan   Assessment & Plan  Severe episode of recurrent major depressive disorder, without psychotic features (Multi)  Plan: 1) Lexapro 15 -> 20 mg at bedtime (continue)           2) Group and milieu therapy (continue)    Discussed potential risks, benefits, and alternatives to medications with patient, who consented to the above medications.  Vitamin D insufficiency  Plan: 1) vitamin d supplementation  Sleep difficulties  Plan: 1) melatonin 5 mg prn           2) will follow  Dysthymic disorder    Chronic dysphoric mood, struggle to feel happy, feel world is a good place, stemming from elementary school. Negative cognitions. Dysthymia reviewed a as a chronic low  level unresolving depression, strongly indicated for intensive cbt. I messaged our sw to set up cbt.    Current malnutriton diganoses:  Malnutrition Diagnosis: Moderate malnutrition related to chronic disease or condition         I spent 27 minutes in the professional and overall care of this patient.      Lyn Unger MD

## 2024-11-29 PROCEDURE — 2500000001 HC RX 250 WO HCPCS SELF ADMINISTERED DRUGS (ALT 637 FOR MEDICARE OP): Performed by: PSYCHIATRY & NEUROLOGY

## 2024-11-29 PROCEDURE — 2500000002 HC RX 250 W HCPCS SELF ADMINISTERED DRUGS (ALT 637 FOR MEDICARE OP, ALT 636 FOR OP/ED): Performed by: PSYCHIATRY & NEUROLOGY

## 2024-11-29 PROCEDURE — 97150 GROUP THERAPEUTIC PROCEDURES: CPT | Mod: GO

## 2024-11-29 PROCEDURE — 1240000001 HC SEMI-PRIVATE BH ROOM DAILY

## 2024-11-29 RX ADMIN — ESCITALOPRAM OXALATE 20 MG: 20 TABLET ORAL at 20:50

## 2024-11-29 RX ADMIN — Medication 1000 UNITS: at 08:23

## 2024-11-29 RX ADMIN — HYDROXYZINE PAMOATE 50 MG: 50 CAPSULE ORAL at 20:50

## 2024-11-29 ASSESSMENT — PAIN SCALES - GENERAL
PAINLEVEL_OUTOF10: 0 - NO PAIN
PAINLEVEL_OUTOF10: 0 - NO PAIN

## 2024-11-29 ASSESSMENT — COLUMBIA-SUICIDE SEVERITY RATING SCALE - C-SSRS
6. HAVE YOU EVER DONE ANYTHING, STARTED TO DO ANYTHING, OR PREPARED TO DO ANYTHING TO END YOUR LIFE?: NO
2. HAVE YOU ACTUALLY HAD ANY THOUGHTS OF KILLING YOURSELF?: NO
1. SINCE LAST CONTACT, HAVE YOU WISHED YOU WERE DEAD OR WISHED YOU COULD GO TO SLEEP AND NOT WAKE UP?: NO
1. SINCE LAST CONTACT, HAVE YOU WISHED YOU WERE DEAD OR WISHED YOU COULD GO TO SLEEP AND NOT WAKE UP?: NO
2. HAVE YOU ACTUALLY HAD ANY THOUGHTS OF KILLING YOURSELF?: NO
6. HAVE YOU EVER DONE ANYTHING, STARTED TO DO ANYTHING, OR PREPARED TO DO ANYTHING TO END YOUR LIFE?: NO

## 2024-11-29 ASSESSMENT — PAIN - FUNCTIONAL ASSESSMENT
PAIN_FUNCTIONAL_ASSESSMENT: 0-10
PAIN_FUNCTIONAL_ASSESSMENT: 0-10

## 2024-11-29 NOTE — CARE PLAN
"The patient's goals for the shift include \"finish sudoku puzzle, stay positive today\"    The clinical goals for the shift include maintain safety    Over the shift, the patient did make progress toward the following goals. Barriers to progression include acuteness of illness. Recommendations to address these barriers include maintain Q 15 minute rounds for patient safety.    "

## 2024-11-29 NOTE — PROGRESS NOTES
Taj Boyd is a 19 y.o. year old male patient who is on U admission day 4.      Subjective   Taj Boyd is a 19 y.o. year old male patient who was personally seen and interviewed, and discussed in morning team rounds. The patient was interviewed alone at bedside, and was easily engaged and cooperative. Patient  reports feeling no depressive symptoms and currently rates his depression at a 0 out of 10. No suicidal ideation or plans were elicited. He also rates his anxiety at a 0 out of 10. No hallucinations or paranoia were endorsed or noted. Taj slept very well overnight and was started on atarax yesterday nightly. He plans on participating in group today.      Objective   Mental Status Exam:   General: Appropriately groomed and dressed in casual/hospital attire.   Appearance: Appears stated age.   Attitude: Calm, cooperative.   Behavior: Appropriate eye contact.   Motor Activity: No agitation or retardation. No EPS/TD. Normal gait and station. Normal muscle tone and bulk.   Speech: Regular rate, rhythm, volume and tone, spontaneous, fluent. Non-pressured.   Mood: calm    Affect: Neutral.   Thought Process: Organized, linear, goal directed.   Thought Content: Does not endorse suicidal ideation or any suicide plans.   Does not endorse homicidal ideation.  No overt delusions or paranoia elicited.    Thought Perception: Does not endorse auditory or visual hallucinations, does not appear to be responding to hallucinatory stimuli.   Cognition: Alert, oriented x 3. No deficits noted. Adequate fund of knowledge. No deficit in recent and remote memory. No deficits in attention, concentration or language.   Insight: Fair-to-good, as patient recognizes symptoms of illness and need for recommended treatments.    Judgment: Intact, as patient can make reasonable decisions about ordinary activities of daily living and necessary medical care recommendations.           LABS:  Results for orders placed or performed during the  hospital encounter of 11/25/24 (from the past 96 hours)   Glucose, Fasting   Result Value Ref Range    Glucose, Fasting 85 74 - 99 mg/dL   Lipid Panel   Result Value Ref Range    Cholesterol 133 0 - 199 mg/dL    HDL-Cholesterol 36.8 mg/dL    Cholesterol/HDL Ratio 3.6     LDL Calculated 87 <=109 mg/dL    VLDL 9 0 - 40 mg/dL    Triglycerides 47 0 - 114 mg/dL    Non HDL Cholesterol 96 0 - 119 mg/dL   Vitamin D 25-Hydroxy,Total (for eval of Vitamin D levels)   Result Value Ref Range    Vitamin D, 25-Hydroxy, Total 26 (L) 30 - 100 ng/mL        Last Recorded Vitals  Visit Vitals  BP 87/56 (BP Location: Right arm, Patient Position: Sitting)   Pulse 62   Temp 36.1 °C (97 °F) (Temporal)   Resp 16        Intake/Output last 3 Shifts:  No intake/output data recorded.    Relevant Results  Scheduled medications  cholecalciferol, 1,000 Units, oral, Daily  escitalopram, 20 mg, oral, Nightly  hydrOXYzine pamoate, 50 mg, oral, Nightly      Continuous medications     PRN medications  PRN medications: alum-mag hydroxide-simeth, diphenhydrAMINE **OR** diphenhydrAMINE, haloperidol **OR** haloperidol lactate, hydrOXYzine pamoate, ibuprofen, LORazepam **OR** LORazepam, melatonin, psyllium               Assessment/Plan   Assessment & Plan  Severe episode of recurrent major depressive disorder, without psychotic features (Multi)  Plan: 1) Lexapro 15 -> 20 mg at bedtime (continue)           2) Group and milieu therapy (continue)    Discussed potential risks, benefits, and alternatives to medications with patient, who consented to the above medications.  Vitamin D insufficiency  Plan: 1) vitamin d supplementation  Sleep difficulties  Plan: 1) melatonin 5 mg prn           2) Vistaril 50 mg nightly PRN  Dysthymic disorder  Stated Above      [place] Mejia DO  PGY-2  Family Medicine

## 2024-11-29 NOTE — GROUP NOTE
Group Topic: Dialectical Behavioral Therapy - Emotional Regulation   Group Date: 11/29/2024  Start Time: 1400  End Time: 1500  Facilitators: ROSALINDA Malave   Department: [facility] REHAB THERAPY VIRTUAL    Number of Participants: 6   Group Focus: acceptance, coping skills, mindfulness, personal responsibility, and self-awareness  Treatment Modality: Recreation Therapy  Interventions utilized were: [id number]-LA-[id number] of Current Emotions and Managing Extreme Emotions, clarification, exploration, patient education, and support  Purpose: coping skills, feelings, and insight or knowledge    Name: Taj Boyd YOB: 2005   MR: [id number]      Facilitator: Recreational Therapist  Level of Participation: minimal, tired  Quality of Participation: cooperative and quiet  Interactions with others:  none, minimal  Mood/Affect:  calm, relaxed, tired   Triggers (if applicable): N/A  Cognition:  capable  Progress: Minimal  Comments: This group included education and discussion on how one can observe emotions, practice mindfulness of body sensations, remembering you aren't your emotion, practicing loving one's emotions, and how to manage extreme emotions (which included using coping techniques when reaching a skills breakdown point). Skills discussed included T.I.P., A.C.C.E.P.T.S., I.M.P.R.O.V.E., and self-soothe. Participants interested in additional DBT resources were provided a “beginner DBT packet”.     Mr. Boyd attended the entire session. He appeared tired and closed his eyes during parts of the group. Patient was able to write down a coping strategy when asked and appeared to comprehend most of the topics being discussed.     Plan: continue with services

## 2024-11-29 NOTE — NURSING NOTE
"Pt was assessed at the end of the hallway for privacy. Pt was bright and calm. Pt was out on the unit watching TV and having snack. Pt denied all anxiety,depression, SI/HI, AVH and pain. Pt stated his goal is \"sleep,\" his coping skill is \"deep breaths,\" and his strength is \"writing.\" Pt was medication complaint, no PRNs needed. Pt is currently sleeping in bed without any obvious physical signs or symptoms of distress. No new orders to carry out at this time. Q15 minute safety checks maintained.   "

## 2024-11-29 NOTE — PROGRESS NOTES
"Occupational Therapy     REHAB Therapy Assessment & Treatment    Patient Name: Taj Boyd  MRN: [id number]  Today's Date: 11/29/2024    Attendance:  Attendance  Activity:  (5605-4377: Positive Affirmations, 3270-2255: Things I Can Control/\"Letting Go\", 8858-5404: Establishing Boundaries)  Participation: Active participation    3/3 groups attended today. Pt was passive participant initially, increased engagement in activity noted when provided with a prompt to share.    Pt did not share any affirmations, but was observed to fill in some blanks on his handout after peers shared their examples. Pt identified something he cannot control as \"what others do or what others think about me\". In the establishing boundaries activity pt wrote \"I do not want toxic relationships, I want inner peace.\". Pt overall, quietly engaged in all topics discussed today. He continues to demonstrate progress towards goals and will benefit from skilled OT services during this LOS.       Encounter Problems       Encounter Problems (Active)       OT Goals       Pt will explore and ID 1-2 strategies to manage stressors/symptoms of illness/ grief more effectively prior to discharge.  (Progressing)       Start:  11/26/24    Expected End:  12/17/24            Pt will ID/ utilize 1-2 ways to increase balance of activity/ re-involve self in functional daily routines/roles prior to discharge.  (Progressing)       Start:  11/26/24    Expected End:  12/17/24            Pt will ID 2-3 effective ways to distract from crisis and ID stressors/ personal symptoms of stress in order to improve management of stress during daily activities.  (Progressing)       Start:  11/26/24    Expected End:  12/17/24            Pt will improve ability to ID and express emotions while accepting and tolerating all emotions, in order to increase awareness of positive emotions.  (Progressing)       Start:  11/26/24    Expected End:  12/17/24            Pt will ID 2 community " resources/programs to join/attend after D/C to improve their support system (Progressing)       Start:  11/26/24    Expected End:  12/17/24                     Education Documentation  No documentation found.  Education Comments  No comments found.          Additional Comments:

## 2024-11-29 NOTE — GROUP NOTE
"Group Topic: Spiritual/Devotional/Thought of the Day   Group Date: 11/29/2024  Start Time: 0730  End Time: 0800  Facilitators: ROSALINDA Malave   Department: [facility] REHAB THERAPY VIRTUAL    Number of Participants: 5   Group Focus: check in, daily focus, and goals  Treatment Modality: Recreation Therapy  Interventions utilized were: Thought for the Day (What Lies Within Us), confrontation, exploration, story telling, and support  Purpose: maladaptive thinking, feelings, and irrational fears    Name: Taj Boyd YOB: 2005   MR: [id number]      Facilitator: Recreational Therapist  Level of Participation: moderate  Quality of Participation: attentive, cooperative, and quiet  Interactions with others:  minimal, soft spoken  Mood/Affect: brightens with interaction and tired  Triggers (if applicable): N/A  Cognition:  capable  Progress: Minimal  Comments: Morning group included reviewing a Thought for the Day and discussing the topic of \"what lies within us\".  The quote related to the reading was \"I can stop running from myself and live in the present\". Patient participants were encouraged to reflect on the reading, share thoughts/opinions, and establish a goal connected to the concept being discussed.     Mr. Boyd did not share any specific thoughts about the reading. He was receptive to his peers and was actively listening. Patient identified that he would like to \"read a book\" that a loved one brought in for him as a goal and skill to use today.     Plan: continue with services      "

## 2024-11-29 NOTE — NURSING NOTE
"Patient was assessed in patient's room away from peers for patient's privacy. Patient presents as calm and friendly. Patient out on the unit and social with peers this shift. Rated anxiety 0/10 and depression 0/10. Denied SI/HI. Denied auditory/visual/other hallucinations. Patient has been medication and group compliant. No PRN medications administered this shift. No complaints of pain or discomfort. Patient's stated goal for today is \"finish sudoku puzzle and stay positive today\". Able to state positive coping skills such as \"deep breathing\". Patient has been cooperative with staffing. Q 15 minute checks to be maintained throughout shift for safety.    "

## 2024-11-29 NOTE — NURSING NOTE
Pt had and uneventful night, pt slept well. No PRNs needed. Pt is currently sleeping in bed without any obvious physical signs or symptoms of distress. No new orders to carry out at this time. Q15 minute safety checks maintained.

## 2024-11-29 NOTE — GROUP NOTE
"Group Topic: Leisure Skills   Group Date: 11/29/2024  Start Time: 1100  End Time: 1200  Facilitators: ROSALINDA Malave   Department: [facility] REHAB THERAPY VIRTUAL    Number of Participants: 5   Group Focus: concentration, leisure skills, and problem solving  Treatment Modality: Recreation Therapy  Interventions utilized were: Card Tricks/Teaching, Skipbo (game), leisure development and mental fitness  Purpose: Leisure Awareness, Cognitive/Social Stimulation, Pleasurable Activity, coping skills    Name: Taj Boyd YOB: 2005   MR: [id number]      Facilitator: Recreational Therapist  Level of Participation: active  Quality of Participation: attentive, cooperative, engaged, and quiet  Interactions with others:  minimal and appropriate  Mood/Affect: appropriate and positive  Triggers (if applicable): N/A  Cognition: coherent/clear and capable  Progress: Moderate  Comments: This session involved leisure activities including \"watching and learning card tricks\" and playing a game called Skipbo.  The activities involved cognitive tasks, social interactions, healthy competition, leisure awareness, and a pleasurable experience. All participants were encouraged to listen to the rules, ask questions as needed, and participate in the activities to the best of their abilities.      Patient attended the entire session. Mr. Boyd was limited with his verbal engagement and spoke only when needing to complete tasks. He was very attentive and cognitively required minimal prompts and cues with the leisure/new learning tasks .    Plan: continue with services      "

## 2024-11-29 NOTE — DISCHARGE INSTR - APPOINTMENTS
[facility]  (Intake with Etelvina on 12/10/24 @ 2:00 PM (Virtual)  Thursday 12/12/24 with ROMARIO Alex @ 2:00 PM (In person)  [address] [address]    Phone: [phone]  Fax: [phone]    [facility] Thursday December 5, 2024 @ 4:00 pm (Counseling: weekly)  [address] [place] [address]  Phone: [phone]

## 2024-11-29 NOTE — CARE PLAN
"The patient's goals for the shift include \" give thanks to my family\"    The clinical goals for the shift include maintain safety    Over the shift, the patient did not make progress toward the following goals. Barriers to progression include illness acutiy. Recommendations to address these barriers include continue with treatment plan.    "

## 2024-11-30 PROCEDURE — 2500000001 HC RX 250 WO HCPCS SELF ADMINISTERED DRUGS (ALT 637 FOR MEDICARE OP): Performed by: PSYCHIATRY & NEUROLOGY

## 2024-11-30 PROCEDURE — 1240000001 HC SEMI-PRIVATE BH ROOM DAILY

## 2024-11-30 PROCEDURE — 99232 SBSQ HOSP IP/OBS MODERATE 35: CPT | Performed by: PSYCHIATRY & NEUROLOGY

## 2024-11-30 PROCEDURE — 2500000002 HC RX 250 W HCPCS SELF ADMINISTERED DRUGS (ALT 637 FOR MEDICARE OP, ALT 636 FOR OP/ED): Performed by: PSYCHIATRY & NEUROLOGY

## 2024-11-30 RX ADMIN — ESCITALOPRAM OXALATE 20 MG: 20 TABLET ORAL at 20:22

## 2024-11-30 RX ADMIN — HYDROXYZINE PAMOATE 50 MG: 50 CAPSULE ORAL at 20:22

## 2024-11-30 RX ADMIN — Medication 1000 UNITS: at 08:20

## 2024-11-30 ASSESSMENT — COLUMBIA-SUICIDE SEVERITY RATING SCALE - C-SSRS
1. SINCE LAST CONTACT, HAVE YOU WISHED YOU WERE DEAD OR WISHED YOU COULD GO TO SLEEP AND NOT WAKE UP?: NO
1. SINCE LAST CONTACT, HAVE YOU WISHED YOU WERE DEAD OR WISHED YOU COULD GO TO SLEEP AND NOT WAKE UP?: NO
6. HAVE YOU EVER DONE ANYTHING, STARTED TO DO ANYTHING, OR PREPARED TO DO ANYTHING TO END YOUR LIFE?: NO
6. HAVE YOU EVER DONE ANYTHING, STARTED TO DO ANYTHING, OR PREPARED TO DO ANYTHING TO END YOUR LIFE?: NO
2. HAVE YOU ACTUALLY HAD ANY THOUGHTS OF KILLING YOURSELF?: NO
2. HAVE YOU ACTUALLY HAD ANY THOUGHTS OF KILLING YOURSELF?: NO

## 2024-11-30 ASSESSMENT — PAIN - FUNCTIONAL ASSESSMENT
PAIN_FUNCTIONAL_ASSESSMENT: 0-10
PAIN_FUNCTIONAL_ASSESSMENT: 0-10

## 2024-11-30 ASSESSMENT — PAIN SCALES - GENERAL
PAINLEVEL_OUTOF10: 0 - NO PAIN
PAINLEVEL_OUTOF10: 0 - NO PAIN

## 2024-11-30 NOTE — GROUP NOTE
"Group Topic: Leisure Skills   Group Date: 11/30/2024  Start Time: 1400  End Time: 1530  Facilitators: KIRK SaavedraS   Department: [facility] REHAB THERAPY VIRTUAL    Number of Participants: 8   Group Focus: leisure skills and social skills  Treatment Modality: Recreational Therapy   Interventions utilized were SpaceFace Attack, [employer] Game, leisure development and mental fitness  Purpose: other: cognitive skills/focus, leisure awareness, social engagement     Name: Taj Boyd YOB: 2005   MR: [id number]      Facilitator: Recreational Therapist  Level of Participation: active  Quality of Participation: appropriate/pleasant, cooperative, and engaged  Interactions with others: appropriate  Mood/Affect: appropriate, brightens with interaction, and positive  Triggers (if applicable): n/a  Cognition: coherent/clear and goal directed  Progress: Moderate  Comments: Patients were gathered to learn and participate in the game \"Benito Attack\". This activity works on cognitive skills, following directions, positive social interaction/teamwork, and promotes leisure awareness.     Pt was pleasant, engaged, and brighter during afternoon session. He appeared to enjoy time with peers and competition of game.     Plan: continue with services      "

## 2024-11-30 NOTE — PROGRESS NOTES
"Taj Boyd is a 19 y.o. year old male patient who is on U admission day 5.      Subjective   Taj Boyd is a 19 y.o. year old male patient who was personally seen and interviewed, and discussed in morning team rounds. The patient was interviewed alone at the end of the hallway (interviewed sitting in a chair), and was easily engaged and cooperative. Patient reports feeling \"alright\" and currently rates his depression at a 0 out of 10. No suicidal ideation or suicide plans were elicited. He also rates his anxiety at a 0 out of 10. No hallucinations or paranoia were endorsed or noted.   Taj slept 7 hours last night (unbroken).  Taj has been benefiting from unit groups.        Objective   Mental Status Exam:   General: Appropriately groomed and dressed in casual/hospital attire.   Appearance: Appears stated age.   Attitude: Calm, cooperative.   Behavior: Appropriate eye contact.   Motor Activity: No agitation or retardation. No EPS/TD. Normal gait and station. Normal muscle tone and bulk.   Speech: Regular rate, rhythm, volume and tone, spontaneous, fluent. Non-pressured.   Mood: \"Alright\"    Affect: Neutral.   Thought Process: Organized, linear, goal directed.   Thought Content: Does not currently endorse suicidal ideation or any suicide plans.   Does not endorse homicidal ideation.  No overt delusions or paranoia elicited.    Thought Perception: Does not endorse auditory or visual hallucinations, does not appear to be responding to hallucinatory stimuli.   Cognition: Alert, oriented x 3. No deficits noted. Adequate fund of knowledge. No deficit in recent and remote memory. No deficits in attention, concentration or language.   Insight: Fair, as patient recognizes symptoms of illness and need for recommended treatments.    Judgment: Poor-to-Fair, as patient can make reasonable decisions about ordinary activities of daily living and necessary medical care recommendations.           LABS:  No results found for " this or any previous visit (from the past 96 hours).       Last Recorded Vitals  Visit Vitals  BP (!) 84/47 (Patient Position: Sitting)   Pulse 72   Temp 36.5 °C (97.7 °F) (Temporal)   Resp 16        Intake/Output last 3 Shifts:  No intake/output data recorded.    Relevant Results  Scheduled medications  cholecalciferol, 1,000 Units, oral, Daily  escitalopram, 20 mg, oral, Nightly  hydrOXYzine pamoate, 50 mg, oral, Nightly      Continuous medications     PRN medications  PRN medications: alum-mag hydroxide-simeth, diphenhydrAMINE **OR** diphenhydrAMINE, haloperidol **OR** haloperidol lactate, hydrOXYzine pamoate, ibuprofen, LORazepam **OR** LORazepam, melatonin, psyllium               Assessment/Plan   Assessment & Plan  Severe episode of recurrent major depressive disorder, without psychotic features (Multi)  Plan: 1) Lexapro 15 -> 20 mg at bedtime (continue)           2) Group and milieu therapy (continue)    Discussed potential risks, benefits, and alternatives to medications with patient, who consented to the above medications.  Vitamin D insufficiency  Plan: 1) vitamin d supplementation  Sleep difficulties  Plan: 1) melatonin 5 mg prn           2) Vistaril 50 mg nightly PRN  Dysthymic disorder  Stated Above      GLORY Hollingsworth MD

## 2024-11-30 NOTE — CARE PLAN
"The patient's goals for the shift include \"Get a consistent, good night sleep\"    The clinical goals for the shift include Medication compliance and Safety    Over the shift, the patient did not make progress toward the following goals. Barriers to progression include acuity of illness. Recommendations to address these barriers include medication compliance, safety, and education.    "

## 2024-11-30 NOTE — NURSING NOTE
Patient is pleasant and cooperative, he attends and participates in groups, he is med compliant. He denies anxiety, depression, SI/HI, and AH/VH. He comes out for meals and snack. He keeps to himself, soft spoken, bright affect. Patient took shower today. No new orders. 15 minute safety checks maintained.

## 2024-11-30 NOTE — GROUP NOTE
Group Topic: Self-Care/Wellness   Group Date: 11/30/2024  Start Time: 0930  End Time: 1030  Facilitators: ROSALINDA Saavedra   Department: [facility] REHAB THERAPY VIRTUAL    Number of Participants: 8   Group Focus: self care, coping skills, goals, personal responsibility, psychiatric education, and self-awareness  Treatment Modality: Recreational Therapy   Interventions utilized were Self-Care Assessment, Self Care Tips Handout, exploration, patient education, story telling, and support  Purpose: coping skills, insight or knowledge, self-worth, and self-care    Name: Taj Boyd YOB: 2005   MR: [id number]      Facilitator: Recreational Therapist  Level of Participation: moderate  Quality of Participation: appropriate/pleasant, cooperative, passive, and quiet  Interactions with others: appropriate  Mood/Affect: closed / guarded  Triggers (if applicable): n/a  Cognition: coherent/clear and goal directed  Progress: Moderate  Comments: Patients completed a self care assessment and evaluated their self care practices across five areas: physical, psychological/emotional, social, spiritual, and professional. After ranking themselves in each category, we discussed strategies for improvement in the areas needing attention. Patients were also provided with the “Self-Care Tips” handout. We discussed the importance of self-care as it relates to our mental/physical health and overall wellbeing. The concepts discussed included (self-care mean taking time to do things you enjoy, self-care also means taking of yourself, make self-care a priority, set specific self-care goals, make self-care a habit, set boundaries to protect your self-care, a few minutes is better than nothing, unhealthy activities don't count as self-care, and keeping up with self-care even when not feeling good).     Pt was quiet and passive overall this morning. He participated calmly in group ice breaker (relaxation beach ball) and completed  self-care assessment, but did not share aloud when given the opportunity. He appeared to be actively listening throughout.     Plan: continue with services

## 2024-11-30 NOTE — NURSING NOTE
Patient assessed while he was in his room, laying on his bed.  Patient easily engaged in conversation.  Denied any anxiety, depression, suicidal ideations, and hallucinations.  States coping skills of deep breathing and using positive affirmations.  His strength is his writing and his goal is to get good, consistent sleep.  No PRN medications were administered.  Will continue to monitor.      11/30/24  0530   Patient slept well through the night.  No PRN medications required or requested.  No changes from previous assessment.

## 2024-11-30 NOTE — CARE PLAN
The patient's goals for the shift include go to groups    The clinical goals for the shift include maintain safety

## 2024-11-30 NOTE — GROUP NOTE
"Group Topic: Chemical Dependency - Dual Diagnosis   Group Date: 11/30/2024  Start Time: 1115  End Time: 1200  Facilitators: KIRK SaavedraS   Department: [facility] REHAB THERAPY VIRTUAL    Number of Participants: 8   Group Focus: coping skills, dual diagnosis, and substance abuse education  Treatment Modality: Recreational Therapy  Interventions utilized were DBT - Common Addictions, Coping Skills - Addictions Handout, patient education and support  Purpose: coping skills, insight or knowledge, relapse prevention strategies, and trigger / craving management    Name: Taj Boyd YOB: 2005   MR: [id number]      Facilitator: Recreational Therapist  Level of Participation: minimal  Quality of Participation: appropriate/pleasant, cooperative, passive, and quiet  Interactions with others: appropriate  Mood/Affect: brightens with interaction and flat  Triggers (if applicable): n/a  Cognition: coherent/clear  Progress: Minimal  Comments: Patients were provided with the DBT - (Distress Tolerance - Common Addictions) Handout, which lists a variety of common addictions and behaviors. We had discussion about the true definition of Addiction and patients were encouraged to identify past/current addictions to share within the group if comfortable. Patients were also provided with the \"Coping Skills Addictions\" handout which includes four main areas (social support, diversions, building new habits, and prevention). We had discussion and patients were given an opportunity to share their thoughts.     Pt was quiet/passive during discussion, but appeared to be actively listening throughout.     Plan: continue with services      "

## 2024-12-01 VITALS
HEIGHT: 70 IN | WEIGHT: 113.1 LBS | HEART RATE: 103 BPM | OXYGEN SATURATION: 97 % | TEMPERATURE: 97.9 F | BODY MASS INDEX: 16.19 KG/M2 | DIASTOLIC BLOOD PRESSURE: 70 MMHG | SYSTOLIC BLOOD PRESSURE: 106 MMHG | RESPIRATION RATE: 16 BRPM

## 2024-12-01 PROCEDURE — 2500000001 HC RX 250 WO HCPCS SELF ADMINISTERED DRUGS (ALT 637 FOR MEDICARE OP): Performed by: PSYCHIATRY & NEUROLOGY

## 2024-12-01 PROCEDURE — 1240000001 HC SEMI-PRIVATE BH ROOM DAILY

## 2024-12-01 PROCEDURE — 99232 SBSQ HOSP IP/OBS MODERATE 35: CPT | Performed by: PSYCHIATRY & NEUROLOGY

## 2024-12-01 PROCEDURE — 2500000002 HC RX 250 W HCPCS SELF ADMINISTERED DRUGS (ALT 637 FOR MEDICARE OP, ALT 636 FOR OP/ED): Performed by: PSYCHIATRY & NEUROLOGY

## 2024-12-01 RX ORDER — TRAZODONE HYDROCHLORIDE 50 MG/1
50 TABLET ORAL NIGHTLY
Status: DISCONTINUED | OUTPATIENT
Start: 2024-12-01 | End: 2024-12-02

## 2024-12-01 RX ADMIN — Medication 1000 UNITS: at 08:35

## 2024-12-01 RX ADMIN — ESCITALOPRAM OXALATE 20 MG: 20 TABLET ORAL at 20:14

## 2024-12-01 RX ADMIN — HYDROXYZINE PAMOATE 50 MG: 50 CAPSULE ORAL at 20:14

## 2024-12-01 ASSESSMENT — COLUMBIA-SUICIDE SEVERITY RATING SCALE - C-SSRS
6. HAVE YOU EVER DONE ANYTHING, STARTED TO DO ANYTHING, OR PREPARED TO DO ANYTHING TO END YOUR LIFE?: NO
2. HAVE YOU ACTUALLY HAD ANY THOUGHTS OF KILLING YOURSELF?: NO
1. SINCE LAST CONTACT, HAVE YOU WISHED YOU WERE DEAD OR WISHED YOU COULD GO TO SLEEP AND NOT WAKE UP?: NO
2. HAVE YOU ACTUALLY HAD ANY THOUGHTS OF KILLING YOURSELF?: NO
1. SINCE LAST CONTACT, HAVE YOU WISHED YOU WERE DEAD OR WISHED YOU COULD GO TO SLEEP AND NOT WAKE UP?: NO
6. HAVE YOU EVER DONE ANYTHING, STARTED TO DO ANYTHING, OR PREPARED TO DO ANYTHING TO END YOUR LIFE?: NO

## 2024-12-01 ASSESSMENT — PAIN SCALES - GENERAL
PAINLEVEL_OUTOF10: 0 - NO PAIN
PAINLEVEL_OUTOF10: 0 - NO PAIN

## 2024-12-01 ASSESSMENT — PAIN - FUNCTIONAL ASSESSMENT
PAIN_FUNCTIONAL_ASSESSMENT: 0-10
PAIN_FUNCTIONAL_ASSESSMENT: 0-10

## 2024-12-01 NOTE — ASSESSMENT & PLAN NOTE
Plan: 1) Lexapro 15 -> 20 mg at bedtime (continue - as pt reports he feels it is helping his mood)           *2) trial Trazodone 50 mg at bedtime (pt requesting for sleep - his father is also on it for sleep)           3) Group and milieu therapy (continue)    Discussed potential risks, benefits, and alternatives to medications with patient, who consented to the above medications.

## 2024-12-01 NOTE — GROUP NOTE
Group Topic: Art Creative   Group Date: 12/1/2024  Start Time: 1400  End Time: 1530  Facilitators: KIRK SaavedraS   Department: [facility] REHAB THERAPY VIRTUAL    Number of Participants: 8   Group Focus: concentration, leisure skills, and social skills  Treatment Modality: Recreational Therapy   Interventions utilized were Acrylic Painting - Ceramics/Music, exploration and leisure development  Purpose: other: creative expression, leisure awareness, social engagement     Name: Taj Boyd YOB: 2005   MR: [id number]      Facilitator: Recreational Therapist  Level of Participation: active  Quality of Participation: appropriate/pleasant, cooperative, and engaged  Interactions with others: appropriate  Mood/Affect: appropriate and positive  Triggers (if applicable): n/a  Cognition: coherent/clear and goal directed  Progress: Moderate  Comments: Patients were gathered to participate in (Acrylic Painting/Ceramics). This activity works on creative expression, fine motor skills, following directions, positive social interaction, and leisure awareness.     Pt arrived late, but participated with encouragement. He was calm, cooperative, and focused on art task for about 45 minutes.     Plan: continue with services

## 2024-12-01 NOTE — CARE PLAN
"The patient's goals for the shift include \"to get sleep\"    The clinical goals for the shift include medication compliance    Over the shift, the patient did not make progress toward the following goals. Barriers to progression include lack of medication knowledge. Recommendations to address these barriers include more education on medications.    " Patient tolerated procedure well.

## 2024-12-01 NOTE — NURSING NOTE
"Pt interview in the patients room  Pt is laying in bed getting ready for bed  Pt stated his day was \"pretty good\"  Pt rated his anxiety 0/10  depression 0/10  and denied everything else at this time    Pt stated his goal \"to get sleep\"  his strength \"Im good at computers\"  and his coping skill \"I take a break\"  Pt is appropriate with his answers to the questions at this time   "

## 2024-12-01 NOTE — NURSING NOTE
Pt pleasant and cooperative with care. Pt attended groups throughout the shift. New order of trazodone 50mg at night. Q15 minute safety checks maintained.

## 2024-12-01 NOTE — NURSING NOTE
"Patient out on the unit, attended groups. Denied anxiety and depression. Denied SI/HI. Denied auditory/visual/other hallucinations. No complaints of pain or discomfort. Medication compliant. Able to state positive coping skills such as \"listening to music\". Q15 minute checks to be maintained throughout shift for safety.   "

## 2024-12-01 NOTE — PROGRESS NOTES
"Taj Boyd is a 19 y.o. year old male patient who is on U admission day 6.        Subjective   Taj Boyd is a 19 y.o. year old male patient who was personally seen and interviewed, and discussed in morning team rounds. The patient was interviewed alone at the end of the hallway (interviewed sitting in a chair) interrupted from a unit group, and was easily engaged and cooperative. This morning, Taj reports feeling \"alright\" (again) and currently rates his depression at a 0 out of 10. No suicidal ideation or suicide plans were elicited. He also rates his anxiety at a 0 out of 10. No hallucinations or paranoia were endorsed or noted.   Taj slept 8 hours last night (unbroken), but states \"after I wake up (during the night), I can't get back to sleep\" - and is requesting a trial of Trazodone).  Taj has been benefiting from unit groups.           Objective   Mental Status Exam:   General: Appropriately groomed and dressed in casual/hospital attire.   Appearance: Appears stated age.   Attitude: Calm, cooperative.   Behavior: Appropriate eye contact.   Motor Activity: No agitation or retardation. No EPS/TD. Normal gait and station. Normal muscle tone and bulk.   Speech: Regular rate, rhythm, volume and tone, spontaneous, fluent. Non-pressured.   Mood: \"Alright\" (again)   Affect: Neutral.   Thought Process: Organized, linear, goal directed.   Thought Content: Does not currently endorse suicidal ideation or any suicide plans.   Does not endorse homicidal ideation.  No overt delusions or paranoia elicited.    Thought Perception: Does not endorse auditory or visual hallucinations, does not appear to be responding to hallucinatory stimuli.   Cognition: Alert, oriented x 3. No deficits noted. Adequate fund of knowledge. No deficit in recent and remote memory. No deficits in attention, concentration or language.   Insight: Fair, as patient recognizes symptoms of illness and need for recommended treatments.    Judgment: " Poor-to-Fair, as patient can make reasonable decisions about ordinary activities of daily living and necessary medical care recommendations.             LABS:  No results found for this or any previous visit (from the past 96 hours).     Last Recorded Vitals  Visit Vitals  /65 (BP Location: Right arm, Patient Position: Sitting)   Pulse 65   Temp 36.4 °C (97.5 °F) (Temporal)   Resp 16        Intake/Output last 3 Shifts:  No intake/output data recorded.    Relevant Results  Scheduled medications  cholecalciferol, 1,000 Units, oral, Daily  escitalopram, 20 mg, oral, Nightly  hydrOXYzine pamoate, 50 mg, oral, Nightly      Continuous medications     PRN medications  PRN medications: alum-mag hydroxide-simeth, diphenhydrAMINE **OR** diphenhydrAMINE, haloperidol **OR** haloperidol lactate, hydrOXYzine pamoate, ibuprofen, LORazepam **OR** LORazepam, melatonin, psyllium               Assessment/Plan   Assessment & Plan  Severe episode of recurrent major depressive disorder, without psychotic features (Multi)  Plan: 1) Lexapro 15 -> 20 mg at bedtime (continue - as pt reports he feels it is helping his mood)           *2) trial Trazodone 50 mg at bedtime (pt requesting for sleep - his father is also on it for sleep)           3) Group and milieu therapy (continue)    Discussed potential risks, benefits, and alternatives to medications with patient, who consented to the above medications.  Vitamin D insufficiency  Plan: 1) vitamin d supplementation  Sleep difficulties  Plan: 1) melatonin 5 mg prn           2) Vistaril 50 mg nightly PRN  Dysthymic disorder  Stated Above

## 2024-12-01 NOTE — GROUP NOTE
"Group Topic: Community   Group Date: 12/1/2024  Start Time: 0930  End Time: 1035  Facilitators: KIRK SaavedraS   Department: [facility] REHAB THERAPY VIRTUAL    Number of Participants: 7   Group Focus: community group, problem solving, and social skills  Treatment Modality: Recreational Therapy   Interventions utilized were Match up the Sayings, Community Meeting, exploration, mental fitness, patient education, story telling, and support  Purpose: maladaptive thinking, insight or knowledge, and other: social engagement, personal growth/goals    Name: Taj Boyd YOB: 2005   MR: [id number]      Facilitator: Recreational Therapist  Level of Participation: active  Quality of Participation: appropriate/pleasant, cooperative, and engaged  Interactions with others: appropriate and gave feedback  Mood/Affect: appropriate, brightens with interaction, and positive  Triggers (if applicable): n/a  Cognition: coherent/clear and goal directed  Progress: Moderate  Comments: In this group session, \"Match up the Sayings\", patients collaborated to reconstruct famous sayings. This activity promoted teamwork and cognitive stimulation. Following the completion of the task, the group engaged in conversation, discussing the meanings of the sayings and collectively exploring their practical application in a recovery process. We also held our Community Meeting this morning which included providing participants an opportunity to understand unit guidelines, rules, expectations, and provide a healthy environment to give suggestions for unit improvements. Community Meeting questionnaire slips were passed out and collected.    Pt was pleasant, brighter this morning, and engaged in group task. He shared his thoughts aloud when prompted along with suggestions during community meeting. Pt commented on the saying \"every cloud has a silver lining\", stating, \"I had a large cloud hanging over me prior to coming to the hospital, but " "being here has made me realize how much support I have and I'm glad I came.\"     Plan: continue with services      "

## 2024-12-01 NOTE — GROUP NOTE
"Group Topic: Leisure Skills   Group Date: 12/1/2024  Start Time: 1115  End Time: 1200  Facilitators: KIRK SaavedraS   Department: [facility] REHAB THERAPY VIRTUAL    Number of Participants: 8   Group Focus: leisure skills and social skills  Treatment Modality: Recreational Therapy   Interventions utilized were Mr. Swartz, leisure development and mental fitness  Purpose: other: cognitive skills/focus, teamwork, healthy competition, leisure awareness, social engagement,     Name: Taj Boyd YOB: 2005   MR: [id number]      Facilitator: Recreational Therapist  Level of Participation: active  Quality of Participation: appropriate/pleasant, cooperative, and engaged  Interactions with others: appropriate  Mood/Affect: appropriate and positive  Triggers (if applicable): n/a  Cognition: coherent/clear and goal directed  Progress: Moderate  Comments: Patients were gathered to learn and participate in the game \"Mr. Swartz\". This activity works on cognitive skills, following directions, positive social interaction/teamwork, and promotes leisure awareness.     Pt was pleasant, social, and fully engaged in group task. Pt appeared to enjoy the friendly competition of the game.     Plan: continue with services      "

## 2024-12-01 NOTE — CARE PLAN
"The patient's goals for the shift include \"try to get better sleep\"    The clinical goals for the shift include maintain safety      Problem: Fall/Injury  Goal: Not fall by end of shift  Outcome: Progressing  Goal: Be free from injury by end of the shift  Outcome: Progressing  Goal: Verbalize understanding of personal risk factors for fall in the hospital  Outcome: Progressing  Goal: Verbalize understanding of risk factor reduction measures to prevent injury from fall in the home  Outcome: Progressing  Goal: Use assistive devices by end of the shift  Outcome: Progressing  Goal: Pace activities to prevent fatigue by end of the shift  Outcome: Progressing     Problem: Sensory Perceptual Alteration as Evidenced by  Goal: Patient/Family participate in treatment and discharge plans  Outcome: Progressing  Goal: Patient/Family verbalizes awareness of resources  Outcome: Progressing  Goal: Participates in unit activities  Outcome: Progressing  Goal: Discusses signs/symptoms of illness/treatment options  Outcome: Progressing  Goal: Initiates reality-based interactions  Outcome: Progressing  Goal: Able to discuss content of hallucinations/delusions  Outcome: Progressing  Goal: Notifies staff when experiencing hallucinations/delusions  Outcome: Progressing  Goal: Verbalizes reduction in hallucinations/delusions  Outcome: Progressing  Goal: Will not act on psychotic perception  Outcome: Progressing  Goal: Understands least restrictive measures  Outcome: Progressing  Goal: Free from restraint events  Outcome: Progressing     Problem: Altered Thought Processes as Evidenced by  Goal: STG - Desires improvement in ability to think and concentrate  Outcome: Progressing  Goal: STG - Participates in Occupational Therapy and other cognitive assessments  Outcome: Progressing     Problem: Potential for Harm to Self or Others  Goal: Participates in unit activities  Outcome: Progressing  Goal: Patient/Family participate in treatment and " discharge plans  Outcome: Progressing  Goal: Identifies deescalation techniques  Outcome: Progressing  Goal: Understands least restrictive measures  Outcome: Progressing  Goal: Identifies stressors that lead to harmful behaviors  Outcome: Progressing  Goal: Notifies staff when experiencing harmful thoughts toward self/others  Outcome: Progressing  Goal: Denies harm toward self or others  Outcome: Progressing  Goal: Free from restraint events  Outcome: Progressing     Problem: Educational/Scholastic Disruption  Goal: Meets educational requirements during hospitalization  Outcome: Progressing  Goal: Attends class without disruptive behavior  Outcome: Progressing  Goal: Completes daily assignments  Outcome: Progressing     Problem: Ineffective Coping  Goal: Identifies ineffective coping skills  Outcome: Progressing  Goal: Identifies healthy coping skills  Outcome: Progressing  Goal: Demonstrates healthy coping skills  Outcome: Progressing  Goal: Participates in unit activities  Outcome: Progressing  Goal: Patient/Family participate in treatment and discharge plans  Outcome: Progressing  Goal: Patient/Family verbalizes awareness of resources  Outcome: Progressing  Goal: Understands least restrictive measures  Outcome: Progressing  Goal: Free from restraint events  Outcome: Progressing     Problem: Alteration in Sleep  Goal: STG - Reports nightly sleep, duration, and quality  Outcome: Progressing  Goal: STG - Identifies sleep hygiene aids  Outcome: Progressing  Goal: STG - Informs staff if unable to sleep  Outcome: Progressing  Goal: STG - Attends breathing and relaxation group  Outcome: Progressing     Problem: Potential for Substance Withdrawal  Goal: Verbalizes signs/symptoms of withdrawal  Outcome: Progressing  Goal: Reports signs/symptoms of withdrawal  Outcome: Progressing  Goal: Free of withdrawal symptoms  Outcome: Progressing     Problem: Anxiety  Goal: Patient/family understands admission protocols  Outcome:  Progressing  Goal: Attempts to manage anxiety with help  Outcome: Progressing  Goal: Verbalizes ways to manage anxiety  Outcome: Progressing  Goal: Implements measures to reduce anxiety  Outcome: Progressing  Goal: Free from restraint events  Outcome: Progressing     Problem: Self Care Deficit  Goal: STG - Patient completes hygiene  Outcome: Progressing  Goal: Increase group attendance  Outcome: Progressing  Goal: Accepts need for medications  Outcome: Progressing  Goal: STG - Goes to and eats meals independently in dining room 100% of time  Outcome: Progressing     Problem: Defensive Coping  Goal: Identifies reckless/dangerous behavior  Outcome: Progressing  Goal: Identifies stressors that lead to reckless/dangerous behavior  Outcome: Progressing  Goal: Discusses and identifies healthy coping skills  Outcome: Progressing  Goal: Demonstrates healthy coping skills  Outcome: Progressing  Goal: Identifies appropriate social interaction  Outcome: Progressing  Goal: Demonstrates appropriate social interactions  Outcome: Progressing  Goal: Patient/Family verbalizes awareness of resources  Outcome: Progressing  Goal: Discusses signs/symptoms of illness/treatment options  Outcome: Progressing  Goal: Patient/Family participate in treatment and discharge plans  Outcome: Progressing  Goal: Understands least restrictive measures  Outcome: Progressing  Goal: Free from restraint events  Outcome: Progressing

## 2024-12-02 PROBLEM — G47.9 SLEEP DIFFICULTIES: Status: RESOLVED | Noted: 2024-11-27 | Resolved: 2024-12-02

## 2024-12-02 PROBLEM — F33.2 SEVERE EPISODE OF RECURRENT MAJOR DEPRESSIVE DISORDER, WITHOUT PSYCHOTIC FEATURES (MULTI): Status: RESOLVED | Noted: 2024-11-25 | Resolved: 2024-12-02

## 2024-12-02 PROBLEM — F34.1 DYSTHYMIC DISORDER: Chronic | Status: RESOLVED | Noted: 2024-11-28 | Resolved: 2024-12-02

## 2024-12-02 PROBLEM — E55.9 VITAMIN D INSUFFICIENCY: Status: RESOLVED | Noted: 2024-06-03 | Resolved: 2024-12-02

## 2024-12-02 PROCEDURE — 1240000001 HC SEMI-PRIVATE BH ROOM DAILY

## 2024-12-02 PROCEDURE — 97150 GROUP THERAPEUTIC PROCEDURES: CPT | Mod: GO,CO

## 2024-12-02 PROCEDURE — 99233 SBSQ HOSP IP/OBS HIGH 50: CPT | Performed by: PSYCHIATRY & NEUROLOGY

## 2024-12-02 PROCEDURE — 2500000001 HC RX 250 WO HCPCS SELF ADMINISTERED DRUGS (ALT 637 FOR MEDICARE OP): Performed by: PSYCHIATRY & NEUROLOGY

## 2024-12-02 PROCEDURE — RXMED WILLOW AMBULATORY MEDICATION CHARGE

## 2024-12-02 RX ORDER — ESCITALOPRAM OXALATE 20 MG/1
20 TABLET ORAL NIGHTLY
Qty: 30 TABLET | Refills: 0 | Status: SHIPPED | OUTPATIENT
Start: 2024-12-02 | End: 2025-01-02

## 2024-12-02 RX ORDER — CHOLECALCIFEROL (VITAMIN D3) 25 MCG
1000 TABLET ORAL DAILY
Qty: 30 TABLET | Refills: 0 | Status: SHIPPED | OUTPATIENT
Start: 2024-12-03 | End: 2025-01-02

## 2024-12-02 RX ORDER — HYDROXYZINE PAMOATE 50 MG/1
50 CAPSULE ORAL NIGHTLY PRN
Status: DISCONTINUED | OUTPATIENT
Start: 2024-12-02 | End: 2024-12-03 | Stop reason: HOSPADM

## 2024-12-02 RX ORDER — HYDROXYZINE PAMOATE 50 MG/1
50 CAPSULE ORAL NIGHTLY PRN
Qty: 30 CAPSULE | Refills: 0 | Status: SHIPPED | OUTPATIENT
Start: 2024-12-02 | End: 2025-01-01

## 2024-12-02 RX ADMIN — ESCITALOPRAM OXALATE 20 MG: 20 TABLET ORAL at 20:31

## 2024-12-02 RX ADMIN — Medication 1000 UNITS: at 08:40

## 2024-12-02 ASSESSMENT — PAIN SCALES - GENERAL
PAINLEVEL_OUTOF10: 0 - NO PAIN
PAINLEVEL_OUTOF10: 0 - NO PAIN

## 2024-12-02 ASSESSMENT — COLUMBIA-SUICIDE SEVERITY RATING SCALE - C-SSRS
6. HAVE YOU EVER DONE ANYTHING, STARTED TO DO ANYTHING, OR PREPARED TO DO ANYTHING TO END YOUR LIFE?: NO
1. SINCE LAST CONTACT, HAVE YOU WISHED YOU WERE DEAD OR WISHED YOU COULD GO TO SLEEP AND NOT WAKE UP?: NO
2. HAVE YOU ACTUALLY HAD ANY THOUGHTS OF KILLING YOURSELF?: NO
1. SINCE LAST CONTACT, HAVE YOU WISHED YOU WERE DEAD OR WISHED YOU COULD GO TO SLEEP AND NOT WAKE UP?: NO
2. HAVE YOU ACTUALLY HAD ANY THOUGHTS OF KILLING YOURSELF?: NO
6. HAVE YOU EVER DONE ANYTHING, STARTED TO DO ANYTHING, OR PREPARED TO DO ANYTHING TO END YOUR LIFE?: NO

## 2024-12-02 ASSESSMENT — ENCOUNTER SYMPTOMS
RESPIRATORY NEGATIVE: 1
ENDOCRINE NEGATIVE: 1
NEUROLOGICAL NEGATIVE: 1
CARDIOVASCULAR NEGATIVE: 1
EYES NEGATIVE: 1
ALLERGIC/IMMUNOLOGIC NEGATIVE: 1
PSYCHIATRIC NEGATIVE: 1
CONSTITUTIONAL NEGATIVE: 1
GASTROINTESTINAL NEGATIVE: 1
HEMATOLOGIC/LYMPHATIC NEGATIVE: 1
MUSCULOSKELETAL NEGATIVE: 1

## 2024-12-02 ASSESSMENT — PAIN - FUNCTIONAL ASSESSMENT
PAIN_FUNCTIONAL_ASSESSMENT: 0-10
PAIN_FUNCTIONAL_ASSESSMENT: 0-10

## 2024-12-02 NOTE — PROGRESS NOTES
"Occupational Therapy     REHAB Therapy Assessment & Treatment    Patient Name: Taj Boyd  MRN: [id number]  Today's Date: 12/2/2024      Activity Assessment:  Music and Self Expression Group: [id number]  Cognitive Task/ Team Collaboration Group: [id number]    2/2 Groups attended     Pt present in both above groups this date with much improved initiation to share, improved social interaction and comfort level when speaking in group discussion. Pt eager to engage in instrumental portion of group and is able to share how \"I really enjoyed that\" Pt also able to share a song he prefers to listen to when he is feeling overwhelmed. Pt with much improved affect, smiling often and eager to share how much he enjoyed playing the instrument he chose. During cog task, pt present for the last half of the activity, pt sits away from his peers however, attentive throughout and eager to engage when prompted. Pt continues to demo G progress toward OT goals as evidenced above. Pt would benefit from continued OT services in order to improve overall self-esteem, personal confidence and positive supports for safe transition at discharge.      Encounter Problems       Encounter Problems (Active)       OT Goals       Pt will explore and ID 1-2 strategies to manage stressors/symptoms of illness/ grief more effectively prior to discharge.  (Progressing)       Start:  11/26/24    Expected End:  12/17/24            Pt will ID/ utilize 1-2 ways to increase balance of activity/ re-involve self in functional daily routines/roles prior to discharge.  (Progressing)       Start:  11/26/24    Expected End:  12/17/24            Pt will ID 2-3 effective ways to distract from crisis and ID stressors/ personal symptoms of stress in order to improve management of stress during daily activities.  (Progressing)       Start:  11/26/24    Expected End:  12/17/24            Pt will improve ability to ID and express emotions while accepting and tolerating all " emotions, in order to increase awareness of positive emotions.  (Progressing)       Start:  11/26/24    Expected End:  12/17/24            Pt will ID 2 community resources/programs to join/attend after D/C to improve their support system (Progressing)       Start:  11/26/24    Expected End:  12/17/24                     Additional Comments:    THOMPSON collaborated with patients nurse and charge nurse throughout the day to provide appropriate support and encouragement to attend groups. Pt up on unit when THOMPSON left last group of the day. All needs met.

## 2024-12-02 NOTE — GROUP NOTE
Group Topic: Music Therapy   Group Date: 12/2/2024  Start Time: 0930  End Time: 1030  Facilitators: Sharonda Dueñas   Department: [facility] EXPRESSIVE THER VIRTUAL    Number of Participants: 8   Group Focus: communication/socialization, coping skills/planning, expressive outlet, and music therapy  Treatment Modality: Music Therapy  Interventions Utilized were: active music engagement, other improvisation, and sharing/discussion    Participants were invited to join a variety of drumming and small percussion group experiences. Opportunities provided to check-in and describe experiences, discuss positive coping skills and grounding techniques, share musical experiences and thoughts and feelings.    Name: Taj Boyd YOB: 2005   MR: [id number]      Level of Participation: active  Quality of Participation: appropriate/pleasant and engaged  Interactions with others: supportive  Mood/Affect: brightens with interaction  Cognition, Pre Treatment: attentive  Cognition, Post Treatment: coherent/clear  Progress: Moderate  Plan: continue with services    Pt began group quietly and somewhat hesitantly, then brightened as group went on. He was engaged and contributed positively to all discussion and musical experiences.

## 2024-12-02 NOTE — ASSESSMENT & PLAN NOTE
Plan: 1) Lexapro 20 mg at bedtime (continue - as pt reports he feels it is helping his mood)           2) trial Trazodone 50 mg at bedtime (pt requesting for sleep - his father is also on it for sleep). Pt did not want to continue after discussing side effects. Discontinue trazodone. Pt to manage sleep with melatonin and better sleep hygiene.           3) Group and milieu therapy (continue)           4) Outpatient counseling at discharge           5) Tentative discharge today or tomorrow back home with parents    Discussed potential risks, benefits, and alternatives to medications with patient, who consented to the above medications.

## 2024-12-02 NOTE — NURSING NOTE
"Patient was assessed in hallway away from peers for patient's privacy. Patient presents as calm and pleasant. Patient out on the unit and social with peers this shift. Rated anxiety 0/10 and depression 0/10. Denied SI/HI. Denied auditory/visual/other hallucinations. Patient has been medication and group compliant. No PRN medications administered this shift. No complaints of pain or discomfort. Patient's stated goal for today is \"take a shower and write in my notebook\". Able to state positive coping skills such as \"deep breathing and listening to calming music\". Patient has been cooperative with staff this shift. Q 15 minute checks to be maintained throughout shift for safety.    "

## 2024-12-02 NOTE — CARE PLAN
"The patient's goals for the shift include \"take a shower , write in my notebook\"    The clinical goals for the shift include maintain safety    Over the shift, the patient did make progress toward the following goals. Barriers to progression include no barriers. Recommendations to address these barriers include maintain Q 15 minute rounds for patient safety.    "

## 2024-12-02 NOTE — GROUP NOTE
Group Topic: Dialectical Behavioral Therapy - Mindfulness   Group Date: 12/2/2024  Start Time: 1400  End Time: 1500  Facilitators: ROSALINDA Malave   Department: [facility] REHAB THERAPY VIRTUAL    Number of Participants: 7   Group Focus: coping skills and mindfulness  Treatment Modality: Recreation Therapy  Interventions utilized were: ASI-[id number]-[id number] Skills with added Nonjudgmentalness, exploration, mental fitness, and patient education  Purpose: coping skills, maladaptive thinking, and insight or knowledge    Name: Taj Boyd YOB: 2005   MR: [id number]      Facilitator: Recreational Therapist  Level of Participation: moderate  Quality of Participation: attentive, cooperative, and quiet  Interactions with others:  minimal, soft spoken   Mood/Affect: brightens with interaction and calm  Triggers (if applicable): N/A  Cognition: concrete and capable  Progress: Minimal  Comments: The HOW skills related to mindfulness development were discussed.  We focused on decreasing judgmental ideas, staying one-mindfully, and effectiveness.  Additional time was spent on working at improving non-judgmentalness and staying one mindfully.     Mr. Boyd completed the warm up mindfulness activity and made one or two comments during the group. He continues to lack engagement and it was difficult to assess what all he may have understood during this skill building session.     Plan: continue with services

## 2024-12-02 NOTE — GROUP NOTE
Group Topic: Gross Motor/Balance Skills   Group Date: 12/2/2024  Start Time: 1600  End Time: 1700  Facilitators: ROSALINDA Malave   Department: [facility] REHAB THERAPY VIRTUAL    Number of Participants: 9   Group Focus: Physical Activity, leisure skills  Treatment Modality: Recreation Therapy  Interventions utilized were: Pop Darts, Music, leisure development  Purpose: Physical Movement, Social Stimulation, Leisure Awareness, coping skills    Name: Taj Boyd YOB: 2005   MR: [id number]      Facilitator: Recreational Therapist  Level of Participation: active  Quality of Participation: appropriate/pleasant, engaged, and quiet  Interactions with others:  minimal, soft spoken  Mood/Affect: appropriate and brightens with interaction  Triggers (if applicable): N/A  Cognition:  capable  Progress: Moderate  Comments: This physical activity involved coordinating movements, social interactions, healthy competition, leisure awareness, and a pleasurable experience.     Patient attended the entire session and completed all desired group tasks. He was independent with all physical movement activities. Patient appeared to enjoy the activity.     Plan: continue with services

## 2024-12-02 NOTE — PROGRESS NOTES
"Taj Boyd is a 19 y.o. male on day 7 of admission presenting with Severe episode of recurrent major depressive disorder, without psychotic features (Multi).    The patient was seen and examined. I reviewed the chart and vital signs from overnight. I reviewed previous notes. I reviewed medications, administered overnight and their reported benefits or side effects. This morning, Taj reports feeling he is \"doing well\" and currently rates his depression at a 0 out of 10. No suicidal ideations or plans were elicited. He also rates his anxiety at a 0 out of 10. No hallucinations or paranoia were endorsed or noted.      Taj slept 6-8 hours last night with intermittent waking. Trazodone was ordered to help with sleep. Pt did not take it last night after hearing about priapism side effects. We also discussed Vistaril as Taj was unsure if this was helping him or not.     He called one of his friends last night which he says made him feel better. He liked hearing how they were doing. Taj has been benefiting from unit groups and will continue to participate.         Physical Exam  Mental Status Exam:   General: appropriately groomed and dressed in hospital attire.  Appearance: appears stated age.  Attitude: calm, cooperative.  Behavior: appropriate eye contact.  Motor Activity: no agitation or retardation. No EPS/TD, normal gait and station, normal muscle tone and bulk.  Speech: regular rate, rhythm, volume and tone, spontaneous, fluent, non-pressured.  Mood: \"doing well\".  Affect: stable  Thought Process: organized, and goal directed.  Thought Content: does not currently endorse suicidal ideation,  denies homicidal ideations, no delusions elicited   Thought Perception: does not endorse auditory hallucinations, denies visual hallucinations, no tactile, olfactory, or gustatory hallucinations elicited.   Cognition: alert, oriented to person, place and time, adequate fund of knowledge, no deficit in recent and remote " memory, no deficits in attention, concentration or language.  Insight: fair, as patient recognizes symptoms of  illness and need for recommended treatments.   Judgment: fair, as patient can make reasonable decisions about ordinary activities of daily living and necessary medical care recommendations.      Objective:  ROS  Review of Systems   Constitutional: Negative.    HENT: Negative.     Eyes: Negative.    Respiratory: Negative.     Cardiovascular: Negative.    Gastrointestinal: Negative.    Endocrine: Negative.    Genitourinary: Negative.    Musculoskeletal: Negative.    Skin: Negative.    Allergic/Immunologic: Negative.    Neurological: Negative.    Hematological: Negative.    Psychiatric/Behavioral: Negative.           Psychiatric ROS - Adult  Anxiety: negative  Depression: negative  Delirium: negative  Psychosis: negative  Bessie: negative  Safety Issues: none  Psychiatric ROS Comment: none     Scheduled medications  cholecalciferol, 1,000 Units, oral, Daily  escitalopram, 20 mg, oral, Nightly  hydrOXYzine pamoate, 50 mg, oral, Nightly  traZODone, 50 mg, oral, Nightly      Continuous medications     PRN medications  PRN medications: alum-mag hydroxide-simeth, diphenhydrAMINE **OR** diphenhydrAMINE, haloperidol **OR** haloperidol lactate, hydrOXYzine pamoate, ibuprofen, LORazepam **OR** LORazepam, melatonin, psyllium       Vitals:      12/1/2024     5:58 AM 12/1/2024     5:59 AM 12/1/2024     7:00 AM 12/1/2024     6:04 PM 12/1/2024     6:05 PM 12/2/2024     6:09 AM 12/2/2024     6:12 AM   Vitals   Systolic 101 101  107 106 106 105   Diastolic 65 65  66 70 63 70   BP Location Right arm Right arm  Left arm      Heart Rate 51 65  115 103 69 72   Temp 36.4 °C (97.5 °F)   36.6 °C (97.9 °F)  36.9 °C (98.4 °F)    Weight (lb)   113.1       BMI   16.23 kg/m2       BSA (m2)   1.59 m2              Labs:  No results found for this or any previous visit (from the past 96 hours).       Assessment/Plan of Care:  Assessment &  Plan  Severe episode of recurrent major depressive disorder, without psychotic features (Multi)  Plan: 1) Lexapro 20 mg at bedtime (continue - as pt reports he feels it is helping his mood)           2) trial Trazodone 50 mg at bedtime (pt requesting for sleep - his father is also on it for sleep). Pt did not want to continue after discussing side effects. Discontinue trazodone. Pt to manage sleep with melatonin and better sleep hygiene.           3) Group and milieu therapy (continue)           4) Outpatient counseling at discharge           5) Tentative discharge today or tomorrow back home with parents    Discussed potential risks, benefits, and alternatives to medications with patient, who consented to the above medications.  Vitamin D insufficiency  Plan: 1) vitamin d supplementation  Sleep difficulties  Plan: 1) melatonin 5 mg prn           2) Vistaril 50 mg nightly PRN  Dysthymic disorder  Stated Above          KAYLENE Torres

## 2024-12-02 NOTE — NURSING NOTE
Pt took his night time medications   Pt did not come out after given his medications  Pt slept all night long  Pt had an uneventful night

## 2024-12-02 NOTE — NURSING NOTE
"Pt interview in the patients room  Pt is laying down and said he wants to go to bed  Pt stated his day was \"Ok\"   Pt rated his anxiety 0/10  depression 0/10  and denied everything else at this time  Pt stated his goal \"get sleep\" his strength \"I like computers\"  and his coping skill \"I take a break from the situation\"  "

## 2024-12-02 NOTE — PROGRESS NOTES
"Nutrition Follow Up Assessment:   Nutrition Assessment       Patient is a 19 y.o. male presenting with recurrent major depressive disorder & SI.     Nutrition History:  Energy Intake: Good > 75 %  Food and Nutrient History: Visit made, pt sitting at lunch table by himself. Reports he has been eating pretty well since admission. Notes intake ranges from % of meals. Reports he drinks 100% of the chocolate Ensure Plus HP ordered TID this admission. Denies growing tired of chocolate flavor & would like to continue to receive them. No additional needs noted at this time.  Food Allergies/Intolerances:  None  GI Symptoms: None  Oral Problems: None     Anthropometrics:  Height: 177.8 cm (5' 10\")   Weight: 51.3 kg (113 lb 1.5 oz)   BMI (Calculated): 16.23  IBW/kg (Dietitian Calculated): 75.5 kg  Percent of IBW: 68 %     Weight History:   Weight         11/25/2024  1231 11/25/2024  1327 12/1/2024  0700       Weight: 50.9 kg (112 lb 3.4 oz) 50.9 kg (112 lb 3.4 oz) 51.3 kg (113 lb 1.5 oz)      Weight Change %:  Weight History / % Weight Change: 0.4 kg weight gain since admission x1 week ago  Significant Weight Loss: No    Nutrition Focused Physical Exam Findings:  defer: Refer to RDN assessment (11/26/24)    Edema:  Edema: none    Physical Findings:  Skin:  (Intact)    Nutrition Significant Labs:  No recent labs to report.    Nutrition Specific Medications:  Scheduled medications  cholecalciferol, 1,000 Units, oral, Daily    I/O:   Last BM Date: 12/01/24    Dietary Orders (From admission, onward)       Start     Ordered    11/26/24 1425  Oral nutritional supplements  Until discontinued        Comments: Chocolate flavor please   Question Answer Comment   Deliver with All meals    Select supplement: Ensure Plus High Protein        11/26/24 1425    11/25/24 1535  Diet Tray Safety tray  Until discontinued        Question:  Select tray type:  Answer:  Safety tray    11/25/24 1534    11/25/24 1226  Adult diet Regular  Diet " effective now        Question:  Diet type  Answer:  Regular    11/25/24 4013             Estimated Needs:   Total Energy Estimated Needs (kCal):  (6402-6718)  Method for Estimating Needs: 35-40 kcals/kg x admit wt (50.9 kg)  Total Protein Estimated Needs (g):  (75+)  Method for Estimating Needs: ~1.5 g/kg x admit wt (50.9 kg)  Total Fluid Estimated Needs (mL):  (0512-2805)  Method for Estimating Needs: 1mL/kcal or per team        Nutrition Diagnosis   Malnutrition Diagnosis  Patient has Malnutrition Diagnosis: Yes  Diagnosis Status: Ongoing  Malnutrition Diagnosis: Moderate malnutrition related to chronic disease or condition  As Evidenced by: reported <75% EENs for >1 month, underweight BMI of 16.1, & mild-moderate muscle wasting/fat loss      Nutrition Interventions/Recommendations         Nutrition Prescription:  Continue liberalized regular diet as tolerated        Nutrition Interventions:   Interventions: Medical food supplement  Medical Food Supplement: Commercial beverage  Goal: Ensure Plus HP TID (provides 350 kcals/20g protein each)    Nutrition Monitoring and Evaluation   Food/Nutrient Related History Monitoring  Monitoring and Evaluation Plan: Energy intake  Energy Intake: Estimated energy intake  Criteria: Meet >75% EENs    Time Spent (min): 60 minutes

## 2024-12-02 NOTE — GROUP NOTE
Group Topic: Goals   Group Date: 12/2/2024  Start Time: 0730  End Time: 0815  Facilitators: ROSALINDA Malave   Department: [facility] REHAB THERAPY VIRTUAL    Number of Participants: 7   Group Focus: check in, daily focus, and goals  Treatment Modality: Recreation Therapy  Interventions utilized were: Goal Mapping (handout), exploration and orientation  Purpose: Goal Identification, insight or knowledge and self-care    Name: Taj Boyd YOB: 2005   MR: [id number]      Facilitator: Recreational Therapist  Level of Participation: moderate  Quality of Participation: attentive, cooperative, and quiet  Interactions with others:  minimal  Mood/Affect: brightens with interaction and flat  Triggers (if applicable): N/A  Cognition:  focused, capable  Progress: Minimal  Comments: This session involved working through a goal or desire by brainstorming three additional ideas related to the individual's main goal.  We looked at needs related to the desire, the abilities/characteristics both positive/negative an individual has, and the benefits to change or positive outcome from accomplishing the goal.    Patient attended the entire session. Mr. Boyd lacked verbal engagement but appeared to fully complete the provided handout. He was attentive throughout. Patient did not share any specific goals in which he would like to accomplish in the near future.     Plan: continue with services

## 2024-12-03 ENCOUNTER — PHARMACY VISIT (OUTPATIENT)
Dept: PHARMACY | Facility: CLINIC | Age: 19
End: 2024-12-03
Payer: MEDICARE

## 2024-12-03 VITALS
OXYGEN SATURATION: 97 % | BODY MASS INDEX: 16.19 KG/M2 | HEART RATE: 74 BPM | RESPIRATION RATE: 16 BRPM | WEIGHT: 113.1 LBS | DIASTOLIC BLOOD PRESSURE: 63 MMHG | HEIGHT: 70 IN | SYSTOLIC BLOOD PRESSURE: 97 MMHG | TEMPERATURE: 96.8 F

## 2024-12-03 PROCEDURE — 99239 HOSP IP/OBS DSCHRG MGMT >30: CPT | Performed by: PSYCHIATRY & NEUROLOGY

## 2024-12-03 PROCEDURE — 97150 GROUP THERAPEUTIC PROCEDURES: CPT | Mod: GO

## 2024-12-03 PROCEDURE — 2500000001 HC RX 250 WO HCPCS SELF ADMINISTERED DRUGS (ALT 637 FOR MEDICARE OP): Performed by: PSYCHIATRY & NEUROLOGY

## 2024-12-03 RX ADMIN — Medication 1000 UNITS: at 08:50

## 2024-12-03 ASSESSMENT — ACTIVITIES OF DAILY LIVING (ADL): LACK_OF_TRANSPORTATION: NO

## 2024-12-03 ASSESSMENT — PAIN SCALES - GENERAL: PAINLEVEL_OUTOF10: 0 - NO PAIN

## 2024-12-03 ASSESSMENT — PAIN - FUNCTIONAL ASSESSMENT: PAIN_FUNCTIONAL_ASSESSMENT: 0-10

## 2024-12-03 NOTE — NURSING NOTE
"Pt assessed in pt's room. Pt presents as calm and friendly. Denied anxiety, depression, SI/HI, and AVH. Denied any pain. Pt's goal for the shift was to \"get some good sleep\" and stated strengths are \"I am a good writer\" and \"I have a good support system.\" Pt listed positive coping skills are \"calm music and deep breathing.\" Pt is medication compliant and cooperative with staff.   "

## 2024-12-03 NOTE — GROUP NOTE
Group Topic: Goals   Group Date: 12/3/2024  Start Time: 0730  End Time: 0810  Facilitators: ROSALINDA Malave   Department: [facility] REHAB THERAPY VIRTUAL    Number of Participants: 8   Group Focus: Habits, check in, daily focus, and goals  Treatment Modality: Recreation Therapy  Interventions utilized were: Bullet Journaling (Gumball Machine, Habits), Goal Sharing, exploration and orientation  Purpose: Goal Identification, coping skills, self-worth, and self-care    Name: Taj Boyd YOB: 2005   MR: [id number]      Facilitator: Recreational Therapist  Level of Participation: moderate  Quality of Participation: appropriate/pleasant and quiet  Interactions with others:  minimal, quiet, soft spoken  Mood/Affect: appropriate and calm  Triggers (if applicable): N/A  Cognition:  capable  Progress: Minimal  Comments: The group discussed habits and utilized a journaling style to record habit productivity over a period of time.  Patients were provided an opportunity to share habits that they would like to improve or ones in which they want to start incorporating into a daily routine more frequently. Participants were provided information on bullet journaling and encouraged to identify some goals to work on for today.     Mr. Boyd attended most of the session. While in attendance he was attentive and non-verbally engaged. He did not share any specific habits or goals in which he would like to work on.     Plan: continue with services

## 2024-12-03 NOTE — NURSING NOTE
"Patient was assessed this morning in his room for privacy. He was pleasant, cooperative and soft spoken. He denies anxiety or depression with no SI/HI or AVH reported. His goal for today was \" hug both parents at the same time\". He was able to identify coping skills as \" listening to calm music and breathing\". Patient states he is \" feeling better\", and is excited to go home with his parents. New order to discharge today at 12PM. Will continue to monitor for safety and encourage group participation.   "

## 2024-12-03 NOTE — PROGRESS NOTES
"Occupational Therapy     REHAB Therapy Assessment & Treatment    Patient Name: Taj Boyd  MRN: [id number]  Today's Date: 12/3/2024    Attendance:  Attendance  Activity:  (8312-3603: A-Z Coping Skills, 8881-2236: Identifying Automatic Negative Thoughts 4037-4273: Peer Support/ Turning a Negative to a Positive)  Participation: Active participation    Attended 3/3 groups    Pt was a quiet participant, pt remained engaged in all 3 activities. During the coping activity, pt identified \"breathing, imagery and getting outside\" as ways to cope or manage stress. Pt was able to identify automatic negative thoughts and replaced 2 of his own thoughts with a positive alternative. During the peer support portion, pt expressed 2 positive replacement thoughts without prompting from the . Pt continues to demo good progress towards goals, evidenced by attendance and participation in group sessions. Will benefit from continued skilled OT services during this LOS.     Encounter Problems       Encounter Problems (Active)       OT Goals       Pt will explore and ID 1-2 strategies to manage stressors/symptoms of illness/ grief more effectively prior to discharge.  (Progressing)       Start:  11/26/24    Expected End:  12/17/24            Pt will ID/ utilize 1-2 ways to increase balance of activity/ re-involve self in functional daily routines/roles prior to discharge.  (Progressing)       Start:  11/26/24    Expected End:  12/17/24            Pt will ID 2-3 effective ways to distract from crisis and ID stressors/ personal symptoms of stress in order to improve management of stress during daily activities.  (Progressing)       Start:  11/26/24    Expected End:  12/17/24            Pt will improve ability to ID and express emotions while accepting and tolerating all emotions, in order to increase awareness of positive emotions.  (Progressing)       Start:  11/26/24    Expected End:  12/17/24            Pt will ID 2 community " resources/programs to join/attend after D/C to improve their support system (Progressing)       Start:  11/26/24    Expected End:  12/17/24

## 2024-12-03 NOTE — NURSING NOTE
Pt slept well during the night. Q15 safety checks maintained. No PRNs given and no changes from previous assessment.

## 2024-12-03 NOTE — SIGNIFICANT EVENT
12/03/24 1203   Discharge Planning   Living Arrangements Parent   Support Systems Parent;Family members;Friends/neighbors   Type of Residence Private residence   Who is requesting discharge planning? Provider   Home or Post Acute Services Community services   Expected Discharge Disposition Home   Does the patient need discharge transport arranged? No   RoundTrip coordination needed? No   Has discharge transport been arranged? Yes  (12:00 pm today (parents to ))   Financial Resource Strain   How hard is it for you to pay for the very basics like food, housing, medical care, and heating? Not hard   Housing Stability   In the last 12 months, was there a time when you were not able to pay the mortgage or rent on time? N   At any time in the past 12 months, were you homeless or living in a shelter (including now)? N   Transportation Needs   In the past 12 months, has lack of transportation kept you from medical appointments or from getting medications? no   In the past 12 months, has lack of transportation kept you from meetings, work, or from getting things needed for daily living? No   Intensity of Service   Intensity of Service >30 min     Met with pt and parents to review dc plan. SW set pt up with [facility] for in person weekly counseling. They also have many group therapies available. Set him up with Psychiatry through [facility].  Provided letter for school. Pt dc'd home in care of his parents. DC summary faxed to new provider.

## 2024-12-03 NOTE — CARE PLAN
"The patient's goals for the shift include \"get some good sleep\"    The clinical goals for the shift include maintain safety    Over the shift, the patient did make progress toward the following goals. Recommendations to address these barriers include continue with treatment plan.      Problem: Fall/Injury  Goal: Not fall by end of shift  Outcome: Progressing  Goal: Be free from injury by end of the shift  Outcome: Progressing  Goal: Verbalize understanding of personal risk factors for fall in the hospital  Outcome: Progressing  Goal: Verbalize understanding of risk factor reduction measures to prevent injury from fall in the home  Outcome: Progressing  Goal: Pace activities to prevent fatigue by end of the shift  Outcome: Progressing     Problem: Sensory Perceptual Alteration as Evidenced by  Goal: Patient/Family participate in treatment and discharge plans  Outcome: Progressing  Goal: Patient/Family verbalizes awareness of resources  Outcome: Progressing  Goal: Participates in unit activities  Outcome: Progressing  Goal: Discusses signs/symptoms of illness/treatment options  Outcome: Progressing  Goal: Initiates reality-based interactions  Outcome: Progressing  Goal: Will not act on psychotic perception  Outcome: Progressing  Goal: Understands least restrictive measures  Outcome: Progressing  Goal: Free from restraint events  Outcome: Progressing     Problem: Altered Thought Processes as Evidenced by  Goal: STG - Desires improvement in ability to think and concentrate  Outcome: Progressing  Goal: STG - Participates in Occupational Therapy and other cognitive assessments  Outcome: Progressing     Problem: Potential for Harm to Self or Others  Goal: Participates in unit activities  Outcome: Progressing  Goal: Patient/Family participate in treatment and discharge plans  Outcome: Progressing  Goal: Identifies deescalation techniques  Outcome: Progressing  Goal: Understands least restrictive measures  Outcome: " Progressing  Goal: Identifies stressors that lead to harmful behaviors  Outcome: Progressing  Goal: Notifies staff when experiencing harmful thoughts toward self/others  Outcome: Progressing  Goal: Denies harm toward self or others  Outcome: Progressing  Goal: Free from restraint events  Outcome: Progressing     Problem: Ineffective Coping  Goal: Identifies ineffective coping skills  Outcome: Progressing  Goal: Identifies healthy coping skills  Outcome: Progressing  Goal: Demonstrates healthy coping skills  Outcome: Progressing  Goal: Participates in unit activities  Outcome: Progressing  Goal: Patient/Family participate in treatment and discharge plans  Outcome: Progressing  Goal: Patient/Family verbalizes awareness of resources  Outcome: Progressing  Goal: Understands least restrictive measures  Outcome: Progressing  Goal: Free from restraint events  Outcome: Progressing     Problem: Alteration in Sleep  Goal: STG - Reports nightly sleep, duration, and quality  Outcome: Progressing  Goal: STG - Identifies sleep hygiene aids  Outcome: Progressing  Goal: STG - Informs staff if unable to sleep  Outcome: Progressing  Goal: STG - Attends breathing and relaxation group  Outcome: Progressing     Problem: Anxiety  Goal: Patient/family understands admission protocols  Outcome: Progressing  Goal: Attempts to manage anxiety with help  Outcome: Progressing  Goal: Verbalizes ways to manage anxiety  Outcome: Progressing  Goal: Implements measures to reduce anxiety  Outcome: Progressing  Goal: Free from restraint events  Outcome: Progressing     Problem: Self Care Deficit  Goal: STG - Patient completes hygiene  Outcome: Progressing  Goal: Increase group attendance  Outcome: Progressing  Goal: Accepts need for medications  Outcome: Progressing  Goal: STG - Goes to and eats meals independently in dining room 100% of time  Outcome: Progressing     Problem: Defensive Coping  Goal: Identifies reckless/dangerous behavior  Outcome:  Progressing  Goal: Identifies stressors that lead to reckless/dangerous behavior  Outcome: Progressing  Goal: Discusses and identifies healthy coping skills  Outcome: Progressing  Goal: Demonstrates healthy coping skills  Outcome: Progressing  Goal: Identifies appropriate social interaction  Outcome: Progressing  Goal: Demonstrates appropriate social interactions  Outcome: Progressing  Goal: Patient/Family verbalizes awareness of resources  Outcome: Progressing  Goal: Discusses signs/symptoms of illness/treatment options  Outcome: Progressing  Goal: Patient/Family participate in treatment and discharge plans  Outcome: Progressing  Goal: Understands least restrictive measures  Outcome: Progressing  Goal: Free from restraint events  Outcome: Progressing

## 2024-12-03 NOTE — CARE PLAN
"The patient's goals for the shift include \" hug both parents at the same time\"    The clinical goals for the shift include maintain safety    Over the shift, the patient made progress toward the following goals of maintaining safety.  Barriers to progression include anxiety. Recommendations to address these barriers include continue medications as ordered.    "

## 2024-12-03 NOTE — DISCHARGE SUMMARY
Discharge Summary      Reason For Admission: The onset of symptoms was gradual starting one year ago with gradually worsening course since that time. Psychosocial stressors include:  school  Discharge Destination: Home    Discharge Diagnosis:  Major depression, severe, recurrent, without psychosis    HISTORY OF PRESENT ILLNESS:  Taj Boyd is a 19 y.o. year old male patient who presented to the Emergency Department reports feelings of depression for the past year with worsening for the past 2 weeks. He also has problems sleeping, increased appetite, decreased energy, decreased concentration increased feelings of hopelessness and helplessness and worthlessness, and anhedonia, all for the past year with worsening in the past 2 weeks. He reports suicidal ideation for the past year along with a suicide plan to lay on the train tracks and wait for a train to come by to end his life. He reports experiencing prior depressive episodes, but not manic symptoms, in the past. These symptoms have never been this bad. No hallucinations or paranoia were endorsed or noted. He does note some anxiety when in social situations but otherwise denies excessive worries about everyday activities that he can't control.         Per [facility] Assessment of 11-:  Patient is a 18yo male presenting to the ED via PD on pink slip with chief complaint of suicidal ideation. Reportedly, “he feels like all his friends and family hate him and are disappointed in him. He advised he was planning on waiting by the train tracks behind over easy for a train to come by to end his life. He stated he's been wanting to hurt himself but can't get the courage to”. Patient's chart, triage, and provider note reviewed prior to assessment. Patient has a psychiatric history of Depression. He is not engaged in outpatient services but is prescribed Lexapro through his PCP. The patient denied history of admissions. He denied hx of SIB/SA, indicated high risk  at triage. BAL and UTOX negative on arrival.      Upon assessment the patient remained calm, cooperative, and notably dysthymic. Patient presented as withdrawn with limited eye contact and concrete thought process. The patient reported he had been talking with a friend last night and “said something about wanting to end my life”, so his friend called Rogelio MARCELLA. He endorsed explicitly stating SI with plan to get hit by the train. Patient reported having taken preparatory actions including walking downtown and “being in the area with the tracks” but he was not forthcoming in terms of intent. The patient otherwise denied HI/AVH and no delusions were elicited. Patient was unable to identify a specific trigger or stressor to current depressive episode but endorses having “struggled for a while now”. As a result of worsening depressive symptoms, patient is now also reportedly failing a number of college courses. The patient states he “gave up on school” and has not been engaging academically or socially. Patient identified his parents and friends as primary supports but reported notably poor self-esteem/image & indicated he felt like a burden to others. He reported constant negative self-talk and belief that he is overweight despite his current physique. Ultimately, patient expressed he is “glad to be here” and remained help-seeking throughout.      Hospital course:  Patient was started on lexapro. Taj attended groups. Patient reported beginning to feel noted improvement in racing thoughts. Taj stated that the sleep aids did not help, trazodone 25mg he was not willing to take on discharge after information was provided including low risk, uncommon side effect, but possible, for painful erection. Patient stated the vistaril helped. We met with the patient and his mother. Patient reported his sleep issues are related to sleep hygiene. States that he goes to bed at 7pm and wakes up early. I did not feel this was a sleep  disorder per say, recommended adjusting sleep hours and hygiene, at home. Patient felt the groups were helpful, he gained useful coping skills.    The patient was seen daily by the team, which included the provider, nursing, occupational therapy and social work. Patient received education regarding their diagnosis and treatment plan. Patient was visible on the unit, medication compliant, cooperative  with care and help seeking. The patient attended group therapy, worked on individualized coping skills and was goal oriented to the future and to ongoing stabilization of their mental health needs.     MMSE:                                                                                                                       General: CM with depression and anxiety symptoms, had suicidal thoughts  Appearance: appropriate grooming and hygiene, appears stated age  Attitude:  calm, cooperative  Behavior: appropriate eye contact  Movement: no psychomotor agitation or retardation. No EPS/TD. Normal gait and station. Normal muscle tone/bulk.  Speech and language:  regular rate, rhythm, volume and tone, spontaneous, fluent.   Mood:  denied depression and anxiety  Affect:  euthymic, full range, affect/mood congruent  Thought process: linear, organized, logical, goal directed thinking and processing  Thought content: does not endorse suicidal ideation or homicidal ideations, no delusions elicited.  Perception: does not endorse auditory/visual hallucinations. Does not appear to be responding to hallucinatory stimuli, no olfactory, somatic or tactile hallucinations were appreciated.  Cognition:  alert and oriented to person, place, time and purpose, short and long term memory within normal limits, attention and concentration within normal limits  Insight:  fair, as patient recognizes symptoms of illness and need for recommended treatments.   Judgment:  can make reasonable decisions about ordinary activities of daily living and  necessary medical care recommendations    LABS   ECG 12 Lead    Result Date: 11/28/2024  Sinus bradycardia Consider right ventricular hypertrophy See ED provider note for full interpretation and clinical correlation Confirmed by Joyce Flores ([id number]) on 11/28/2024 2:56:43 PM    FUNCTIONAL ESTIMATES  Estimate of Intelligence: average   Estimate of Capacity for Activities of Daily Living:   independent      A safety risk assessment was completed on the day of discharge. The patient is judged a minimal suicide risk due to:  1)  No access to guns.  2) Denied prior suicide attempts.  3) Denies current suicidal ideation or plans  4) Goal directed to the future: therapy  5) No current symptoms of a major depressive episode.  The team deemed the patient to be a low risk of self harm and recommend the patient for discharge today.    Substance Use Risk Assessment:  Denied use    I spent over 30 minutes in the preparation of this summary. All 11 elements of the transition record were discussed with the patient and or caregiver and the receiving inpatient facility (if applicable).  A copy of the transition record was given to the patient and was transmitted to the outpatient provider accepting the patient's care following  discharge.    Patient's illness, medication/potential for medication side effects, and the medication recommended along with the importance of mediation compliance benefits and risk were reviewed prior to discharge with the patient and with designated family member patient (if applicable).  The patient voiced understanding of their diagnosis and treatment plan.  The patient was counseled not to stop medications without the supervision of a psychiatrist.  The patient was counseled to follow-up with their outpatient medical provider as indicated.   The patient was counseled that if there was an increase in mental health issues, depression, anxiety, medication side effects, self harming thoughts or thoughts to  harm others, to call [facility] or 911 and come to the nearest emergency room.   The patient also received information regarding advanced mental and medical health directives during this hospitalization which they could discuss with their outpatient provider.   The plan was discussed with the patient, the nurse and the social work department. The patient voiced understanding and agreement with the plan.     these medications from [facility]     Medications on Discharge:  cholecalciferol, 1,000 Units, oral, Daily  escitalopram, 20 mg, oral, Nightly     Medication to Stop:  none    Follow up appointments:  Diet instructions  Return to previous diet  Diet type:  Return to previous diet  Instructions: Continue on the same type of diet and foods as you were eating before your admission. Drink plenty of water.  What's Next  What's Next           Follow up with Amy Engel  [facility]  [phone]  Additional Information  [facility]  (Intake with Etelvina on 12/10/24 @ 2:00 PM (Virtual)  Thursday 12/12/24 with ROMARIO Alex @ 2:00 PM (In person)  [address] [address]     Phone: [phone]  Fax: [phone]     [facility] Thursday December 5, 2024 @ 4:00 pm (Counseling: weekly)  [address] [place] [address]  Phone: [phone]  Psychiatric Continuing Care Plan  Admission Details:      Do you have a guardian: No .  The hospital or service name is [facility]. The reason for your hospitalization was  .  Safety recommendations include Safety precautions at home. Successful interventions implemented include Coping skills, Daily routine/structure, Family education, Individual programming, Limit setting/redirection, Meditation/exercise, Music, Snoezelen/comfort room, Sleep routines, Relaxation techniques, Parenting strategies/education, Group programming. Social interventions include Other.   Are you a tobacco user: Yes, NOT  tobacco user.        Advance Directives:   Do you have a Advanced Directive or DNR: No  Do you have a Medical Advanced Directive:  No, reason given for no Medical Advance Directive was Did not wish to discuss Adv Direction/surrogate.     Information about Medical Advanced Directives was given or requested: Patient/family declined.  Do you have a Mental Health Advanced Directive: No,  reason given for no directive was Did not wish to discuss Adv Direction/surrogate.    Information about Mental Health Advanced Directives was given or requested: Patient/family declined.     Discharge Details:  You were discharged to Home.  Pending test results:  No.   Were there any procedures performed during this hospitalization:  No .  If you need any additional mental health help, please call  [facility] [phone], Crisis Hotline (125) 624-2316, [facility] - [address] [place] [address] - (190) 777-2060, [employer] [place] on Mental Illness - (955) 897 JANELL (7364) or info@[email or web address], [facility] Suicide & Crisis Lifeline 9-8-8 or 1-455.340.8679 (TALK).  You were discharged on multiple antipsychotic medications:  No .     Emergencies Related to Your Inpatient Hospitalization:  Please call Dr Unger at [address] [phone] if you have an emergency that is related to your recent hospitalization.     Elements of Transition:  All 11 elements of the transition record were discussed with/provided to patient and/or caregiver at time of discharge by discharging inpatient facility or the 4 key elements of the transition record were discussed with receiving inpatient facility: Yes.   Community Resources  Crisis Center Hotline:  National Suicide  & Crisis Prevention Lifeline: Call or Text 983 or 1-226.186.4104 (TALK)  Crisis Text Line: Text HOME to 497295       Lyn Unger MD

## 2024-12-05 NOTE — SIGNIFICANT EVENT
Follow Up Phone Call    Outgoing phone call    Spoke to: Taj Boyd Relationship:self   Phone number: [phone]      Outcome: contacted patient/ family   No chief complaint on file.         Diagnosis:Not applicable      States he is feeling better. Denies suicidal ideation at this time. Has emergency contact numbers on his person. Encouraged compliance with medications and appointments. Voiced understanding. No further questions or concerns.